# Patient Record
Sex: MALE | Race: BLACK OR AFRICAN AMERICAN | Employment: UNEMPLOYED | URBAN - METROPOLITAN AREA
[De-identification: names, ages, dates, MRNs, and addresses within clinical notes are randomized per-mention and may not be internally consistent; named-entity substitution may affect disease eponyms.]

---

## 2019-01-05 ENCOUNTER — HOSPITAL ENCOUNTER (EMERGENCY)
Age: 44
Discharge: HOME | End: 2019-01-05
Attending: EMERGENCY MEDICINE
Payer: SUBSIDIZED

## 2019-01-05 VITALS
DIASTOLIC BLOOD PRESSURE: 90 MMHG | TEMPERATURE: 97.8 F | OXYGEN SATURATION: 100 % | BODY MASS INDEX: 44.71 KG/M2 | RESPIRATION RATE: 18 BRPM | SYSTOLIC BLOOD PRESSURE: 154 MMHG | HEIGHT: 67 IN | WEIGHT: 284.83 LBS | HEART RATE: 78 BPM

## 2019-01-05 DIAGNOSIS — H65.93 FLUID LEVEL BEHIND TYMPANIC MEMBRANE OF BOTH EARS: ICD-10-CM

## 2019-01-05 DIAGNOSIS — Z20.2 EXPOSURE TO STD: Primary | ICD-10-CM

## 2019-01-05 LAB
APPEARANCE UR: CLEAR
BACTERIA URNS QL MICRO: NEGATIVE /HPF
BILIRUB UR QL: NEGATIVE
COLOR UR: NORMAL
EPITH CASTS URNS QL MICRO: NORMAL /LPF
GLUCOSE UR STRIP.AUTO-MCNC: NEGATIVE MG/DL
HGB UR QL STRIP: NEGATIVE
HYALINE CASTS URNS QL MICRO: NORMAL /LPF (ref 0–5)
KETONES UR QL STRIP.AUTO: NEGATIVE MG/DL
LEUKOCYTE ESTERASE UR QL STRIP.AUTO: NEGATIVE
NITRITE UR QL STRIP.AUTO: NEGATIVE
PH UR STRIP: 5.5 [PH] (ref 5–8)
PROT UR STRIP-MCNC: NEGATIVE MG/DL
RBC #/AREA URNS HPF: NORMAL /HPF (ref 0–5)
SP GR UR REFRACTOMETRY: 1.02 (ref 1–1.03)
UA: UC IF INDICATED,UAUC: NORMAL
UROBILINOGEN UR QL STRIP.AUTO: 0.2 EU/DL (ref 0.2–1)
WBC URNS QL MICRO: NORMAL /HPF (ref 0–4)

## 2019-01-05 PROCEDURE — 87491 CHLMYD TRACH DNA AMP PROBE: CPT

## 2019-01-05 PROCEDURE — 81001 URINALYSIS AUTO W/SCOPE: CPT

## 2019-01-05 PROCEDURE — 99282 EMERGENCY DEPT VISIT SF MDM: CPT

## 2019-01-05 RX ORDER — METRONIDAZOLE 500 MG/1
500 TABLET ORAL 2 TIMES DAILY
Qty: 20 TAB | Refills: 0 | Status: SHIPPED | OUTPATIENT
Start: 2019-01-05 | End: 2019-01-15

## 2019-01-05 NOTE — DISCHARGE INSTRUCTIONS
Patient Education        Exposure to Sexually Transmitted Infections: Care Instructions  Your Care Instructions  Sexually transmitted infections (STIs) are those diseases spread by sexual contact. There are at least 20 different STIs, including chlamydia, gonorrhea, syphilis, and human immunodeficiency virus (HIV), which causes AIDS. Bacteria-caused STIs can be treated and cured. STIs caused by viruses, such as HIV, can be treated but not cured. Some STIs can reduce a woman's chances of getting pregnant in the future. STIs are spread during sexual contact, such as vaginal intercourse and oral or anal sex. Follow-up care is a key part of your treatment and safety. Be sure to make and go to all appointments, and call your doctor if you are having problems. It's also a good idea to know your test results and keep a list of the medicines you take. How can you care for yourself at home? · Your doctor may have given you a shot of antibiotics. If your doctor prescribed antibiotic pills, take them as directed. Do not stop taking them just because you feel better. You need to take the full course of antibiotics. · Do not have sexual contact while you have symptoms of an STI or are being treated for an STI. · Tell your sex partner (or partners) that he or she will need treatment. · If you are a woman, do not douche. Douching changes the normal balance of bacteria in the vagina and may spread an infection up into your reproductive organs. To prevent exposure to STIs in the future  · Use latex condoms every time you have sex. Use them from the beginning to the end of sexual contact. · Talk to your partner before you have sex. Find out if he or she has or is at risk for any STI. Keep in mind that a person may be able to spread an STI even if he or she does not have symptoms. · Do not have sex if you are being treated for an STI.   · Do not have sex with anyone who has symptoms of an STI, such as sores on the genitals or mouth.  · Having one sex partner (who does not have STIs and does not have sex with anyone else) is a good way to avoid STIs. When should you call for help? Call your doctor now or seek immediate medical care if:    · You have new pain in your belly or pelvis.     · You have symptoms of a urinary tract infection. These may include:  ? Pain or burning when you urinate. ? A frequent need to urinate without being able to pass much urine. ? Pain in the flank, which is just below the rib cage and above the waist on either side of the back. ? Blood in your urine. ? A fever.     · You have new or worsening pain or swelling in the scrotum.    Watch closely for changes in your health, and be sure to contact your doctor if:    · You have unusual vaginal bleeding.     · You have a discharge from the vagina or penis.     · You have any new symptoms, such as sores, bumps, rashes, blisters, or warts.     · You have itching, tingling, pain, or burning in the genital or anal area.     · You think you may have an STI. Where can you learn more? Go to http://orly-zachery.info/. Enter R065 in the search box to learn more about \"Exposure to Sexually Transmitted Infections: Care Instructions. \"  Current as of: November 27, 2017  Content Version: 11.8  © 9874-5848 [employer]. Care instructions adapted under license by [employer] (which disclaims liability or warranty for this information). If you have questions about a medical condition or this instruction, always ask your healthcare professional. [place] [address] any warranty or liability for your use of this information.

## 2019-01-05 NOTE — ED NOTES
JAHAIRA Marley reviewed discharge instructions with the patient. The patient verbalized understanding. Patient ambulatory out of ED with steady gait. No further complaints noted.

## 2019-01-05 NOTE — ED PROVIDER NOTES
EMERGENCY DEPARTMENT HISTORY AND PHYSICAL EXAM    Date: 1/5/2019  Patient Name: Anderson Dial    History of Presenting Illness     Chief Complaint   Patient presents with    Ear Pain     right ear pain for a few days    Exposure to STD     denies any s/s but would like to be checked         History Provided By: Patient    HPI: Anderson Dial is a 41 y.o. male with a PMH of hypertension who presents with cc of right ear pain for 2 days and exposure to trich from his girlfriend. Pt states he has no symptoms and has been trying advil cold and sinus for his hear pain. He specifically denies any urinary symptoms or penile discharge, fevers, chills, nausea, vomiting, chest pain, shortness of breath, headache, rash, diarrhea, sweating or weight loss. All other ROS negative at this time  Pt is in no acute distress and is speaking in full sentences      PCP: Ricarda Lance, DO    Current Outpatient Medications   Medication Sig Dispense Refill    metroNIDAZOLE (FLAGYL) 500 mg tablet Take 1 Tab by mouth two (2) times a day for 10 days. 21 Tab 0       Past History     Past Medical History:  Past Medical History:   Diagnosis Date    Hypertension     Rotator cuff injury        Past Surgical History:  Past Surgical History:   Procedure Laterality Date    HX BACK SURGERY  1999    HX ORTHOPAEDIC      finger        Family History:  Family History   Problem Relation Age of Onset    Asthma Mother     Hypertension Mother     Hypertension Father     Hypertension Paternal Uncle     Diabetes Paternal Grandmother        Social History:  Social History     Tobacco Use    Smoking status: Current Every Day Smoker     Packs/day: 0.50     Years: 0.00     Pack years: 0.00   Substance Use Topics    Alcohol use: Yes     Comment: occasionally    Drug use: Yes     Types: Marijuana, Opiates       Allergies:   Allergies   Allergen Reactions    Morphine Nausea and Vomiting         Review of Systems   Review of Systems Constitutional: Negative. Negative for chills and fever. HENT: Positive for ear pain. Negative for congestion, ear discharge, sinus pressure, sinus pain, sneezing and sore throat. Eyes: Negative. Negative for pain and redness. Respiratory: Negative. Negative for shortness of breath. Cardiovascular: Negative. Negative for chest pain. Gastrointestinal: Negative. Negative for abdominal pain, diarrhea, nausea and vomiting. Endocrine: Negative. Genitourinary: Negative. Negative for discharge, dysuria, flank pain, penile pain, penile swelling, scrotal swelling, testicular pain and urgency. Musculoskeletal: Negative. Skin: Negative. Allergic/Immunologic: Negative. Neurological: Negative. Negative for headaches. Hematological: Negative. Psychiatric/Behavioral: Negative. All other systems reviewed and are negative. Physical Exam     Vitals:    01/05/19 1357   BP: 154/90   Pulse: 78   Resp: 18   Temp: 97.8 °F (36.6 °C)   SpO2: 100%   Weight: 129.2 kg (284 lb 13.4 oz)   Height: 5' 7\" (1.702 m)     Physical Exam   Constitutional: He is oriented to person, place, and time. He appears well-developed and well-nourished. No distress. HENT:   Head: Normocephalic and atraumatic. Right Ear: Hearing, external ear and ear canal normal. No mastoid tenderness. Tympanic membrane is bulging. Tympanic membrane is not perforated, not erythematous and not retracted. No hemotympanum. Left Ear: Hearing, external ear and ear canal normal. No mastoid tenderness. Tympanic membrane is bulging. Tympanic membrane is not perforated, not erythematous and not retracted. No hemotympanum. Nose: Nose normal.   Mouth/Throat: Oropharynx is clear and moist. No oropharyngeal exudate. Eyes: Conjunctivae and EOM are normal. Pupils are equal, round, and reactive to light. Neck: Normal range of motion. Neck supple.    Cardiovascular: Normal rate, regular rhythm, normal heart sounds and intact distal pulses. Pulmonary/Chest: Effort normal and breath sounds normal. No respiratory distress. He has no wheezes. He has no rales. Abdominal: Soft. Bowel sounds are normal. He exhibits no distension. There is no tenderness. There is no rebound, no guarding, no CVA tenderness, no tenderness at McBurney's point and negative Bravo's sign. Musculoskeletal: Normal range of motion. He exhibits no edema, tenderness or deformity. Lymphadenopathy:     He has no cervical adenopathy. Neurological: He is alert and oriented to person, place, and time. He has normal reflexes. He displays normal reflexes. No cranial nerve deficit. He exhibits normal muscle tone. Coordination normal.   Skin: No rash noted. He is not diaphoretic. Psychiatric: He has a normal mood and affect. His behavior is normal. Judgment and thought content normal.   Nursing note and vitals reviewed. Diagnostic Study Results     Labs -     Recent Results (from the past 12 hour(s))   URINALYSIS W/ REFLEX CULTURE    Collection Time: 01/05/19  2:03 PM   Result Value Ref Range    Color YELLOW/STRAW      Appearance CLEAR CLEAR      Specific gravity 1.020 1.003 - 1.030      pH (UA) 5.5 5.0 - 8.0      Protein NEGATIVE  NEG mg/dL    Glucose NEGATIVE  NEG mg/dL    Ketone NEGATIVE  NEG mg/dL    Bilirubin NEGATIVE  NEG      Blood NEGATIVE  NEG      Urobilinogen 0.2 0.2 - 1.0 EU/dL    Nitrites NEGATIVE  NEG      Leukocyte Esterase NEGATIVE  NEG      WBC 0-4 0 - 4 /hpf    RBC 0-5 0 - 5 /hpf    Epithelial cells FEW FEW /lpf    Bacteria NEGATIVE  NEG /hpf    UA:UC IF INDICATED CULTURE NOT INDICATED BY UA RESULT CNI      Hyaline cast 0-2 0 - 5 /lpf       Radiologic Studies -   No orders to display     CT Results  (Last 48 hours)    None        CXR Results  (Last 48 hours)    None            Medical Decision Making   I am the first provider for this patient.     I reviewed the vital signs, available nursing notes, past medical history, past surgical history, family history and social history. Vital Signs-Reviewed the patient's vital signs. Records Reviewed: Nursing Notes, Old Medical Records, Previous Radiology Studies and Previous Laboratory Studies            Disposition:  Discharge     DISCHARGE NOTE:   Care plan outlined and precautions discussed. Patient has no new complaints, changes, or physical findings. Results of visit were reviewed with the patient. All medications were reviewed with the patient; will d/c home. All of pt's questions and concerns were addressed. Patient was instructed and agrees to follow up with pcp, as well as to return to the ED upon further deterioration. Patient is ready to go home. Follow-up Information     Follow up With Specialties Details Why Bryan Zhu DO Internal Medicine Schedule an appointment as soon as possible for a visit in 3 days If symptoms worsen [address] [facility]  [phone]            Discharge Medication List as of 1/5/2019  2:47 PM      START taking these medications    Details   metroNIDAZOLE (FLAGYL) 500 mg tablet Take 1 Tab by mouth two (2) times a day for 10 days. , Normal, Disp-20 Tab, R-0             Provider Notes (Medical Decision Making):   DDX: om, oe, fluid effusion, trich, gc     Worsening si/sxs discussed extensively   Follow up with PCP or RTC if symptoms/signs worsen  Side effects of medication discussed  Education materials provided at discharge   Pt verbalizes agreement with plan    Procedures:  Procedures        Diagnosis     Clinical Impression:   1. Exposure to STD    2.  Fluid level behind tympanic membrane of both ears

## 2019-01-07 LAB
C TRACH DNA SPEC QL NAA+PROBE: NEGATIVE
N GONORRHOEA DNA SPEC QL NAA+PROBE: NEGATIVE
SAMPLE TYPE: NORMAL
SERVICE CMNT-IMP: NORMAL
SPECIMEN SOURCE: NORMAL

## 2019-04-04 ENCOUNTER — OFFICE VISIT (OUTPATIENT)
Dept: FAMILY MEDICINE CLINIC | Age: 44
End: 2019-04-04

## 2019-04-04 VITALS
BODY MASS INDEX: 45.04 KG/M2 | HEIGHT: 67 IN | TEMPERATURE: 97.5 F | RESPIRATION RATE: 20 BRPM | DIASTOLIC BLOOD PRESSURE: 68 MMHG | OXYGEN SATURATION: 100 % | HEART RATE: 71 BPM | SYSTOLIC BLOOD PRESSURE: 146 MMHG | WEIGHT: 287 LBS

## 2019-04-04 DIAGNOSIS — I10 HYPERTENSION GOAL BP (BLOOD PRESSURE) < 130/80: ICD-10-CM

## 2019-04-04 DIAGNOSIS — R35.0 FREQUENCY OF URINATION: ICD-10-CM

## 2019-04-04 DIAGNOSIS — E55.9 VITAMIN D DEFICIENCY: ICD-10-CM

## 2019-04-04 DIAGNOSIS — M79.661 BILATERAL CALF PAIN: ICD-10-CM

## 2019-04-04 DIAGNOSIS — Z00.00 ENCOUNTER FOR ANNUAL PHYSICAL EXAM: ICD-10-CM

## 2019-04-04 DIAGNOSIS — R73.02 IGT (IMPAIRED GLUCOSE TOLERANCE): ICD-10-CM

## 2019-04-04 DIAGNOSIS — Z13.220 SCREENING CHOLESTEROL LEVEL: ICD-10-CM

## 2019-04-04 DIAGNOSIS — M25.561 PAIN AND SWELLING OF RIGHT KNEE: ICD-10-CM

## 2019-04-04 DIAGNOSIS — Z13.29 SCREENING FOR THYROID DISORDER: ICD-10-CM

## 2019-04-04 DIAGNOSIS — M79.662 BILATERAL CALF PAIN: ICD-10-CM

## 2019-04-04 DIAGNOSIS — M25.461 PAIN AND SWELLING OF RIGHT KNEE: ICD-10-CM

## 2019-04-04 DIAGNOSIS — F17.200 CURRENT EVERY DAY SMOKER: ICD-10-CM

## 2019-04-04 DIAGNOSIS — Z23 ENCOUNTER FOR IMMUNIZATION: Primary | ICD-10-CM

## 2019-04-04 PROBLEM — E66.01 OBESITY, MORBID (HCC): Status: ACTIVE | Noted: 2019-04-04

## 2019-04-04 RX ORDER — AMLODIPINE BESYLATE 5 MG/1
5 TABLET ORAL DAILY
Qty: 30 TAB | Refills: 2 | Status: SHIPPED | OUTPATIENT
Start: 2019-04-04 | End: 2019-08-08

## 2019-04-04 NOTE — PROGRESS NOTES
Chief Complaint   Patient presents with    New Patient    Circulatory problem     right leg    Knee Injury     Right Knee     HPI:  Alexandria Sharma is a 41 y.o. AA male with history of hypertension presents to establish care  He has complaints of right leg pain and remote history of right knee Injury during foot ball game in high school. Pain is intermittent, without swelling, located in medial and lateral areas. Also, discussed the patient's BMI with him.     BMI follow up plan is as follows: dietary management education, guidance and counseling, encourage exercise and monitor weight    Review of Systems  Constitutional: negative for fevers,/chills  Eyes:   negative for visual disturbance   Respiratory:  negative for cough, dyspnea,wheezing  CV:   negative for chest pain, palpitations, lower extremity edema  GI:   negative for abdominal pain  Endo:               negative for polyuria,polydipsia,polyphagia,heat intolerance  Genitourinary: negative for frequency, dysuria   Integument:  negative for rash and pruritus  Musculoskel: positive for right knee and foot pain  Neurological:  negative for headaches, dizziness    Behavl/Psych: negative for feelings of anxiety, depression, mood changes    Past Medical History:   Diagnosis Date    Hypertension     Rotator cuff injury      Past Surgical History:   Procedure Laterality Date    HX BACK SURGERY  1999    HX ORTHOPAEDIC      finger     HX SHOULDER ARTHROSCOPY       Social History     Socioeconomic History    Marital status: SINGLE     Spouse name: Not on file    Number of children: Not on file    Years of education: Not on file    Highest education level: Not on file   Tobacco Use    Smoking status: Current Every Day Smoker     Packs/day: 0.50     Years: 0.00     Pack years: 0.00    Smokeless tobacco: Never Used   Substance and Sexual Activity    Alcohol use: Yes     Comment: occasionally    Drug use: Yes     Types: Marijuana    Sexual activity: Yes     Partners: Female     Birth control/protection: None     Family History   Problem Relation Age of Onset    Asthma Mother     Hypertension Mother     Hypertension Father     Hypertension Paternal Uncle     Diabetes Paternal Grandmother        Allergies   Allergen Reactions    Morphine Nausea and Vomiting       Objective:  Visit Vitals  /68   Pulse 71   Temp 97.5 °F (36.4 °C) (Oral)   Resp 20   Ht 5' 7\" (1.702 m)   Wt 287 lb (130.2 kg)   SpO2 100%   BMI 44.95 kg/m²     Physical Exam:   General appearance - alert, well appearing in no distress  Mental status - alert, oriented to person, place, and time  EYE-PERRL, EOMI  ENT-ENT exam normal, no neck nodes or sinus tenderness  Mouth - mucous membranes moist, pharynx normal without lesions  Neck - supple, no significant adenopathy   Chest - clear to auscultation, no wheezes, rales or rhonchi  Heart - normal rate, regular rhythm, normal blood pressure  Abdomen - soft, nontender, nondistended, no organomegaly  Ext-peripheral pulses normal, no pedal edema  Neuro -alert, oriented, normal speech, no focal findings   Back-full range of motion, no tenderness, palpable spasm or pain on motion   Knee-antalgic gait, reduced range of motion, mild tenderness     Results for orders placed or performed during the hospital encounter of 01/05/19   CHLAMYDIA/GC PCR   Result Value Ref Range    Sample type URINE      Source URINE      Chlamydia amplified NEGATIVE  NEG      N. gonorrhea, amplified NEGATIVE  NEG      Comment        Testing performed by the Roche [place] CT/NG method, utilizing PCR amplification to identify DNA of the pathogens. This method is not recommended as the sole method of evaluation of cases of sexual abuse nor for other medico-legal indications.    URINALYSIS W/ REFLEX CULTURE   Result Value Ref Range    Color YELLOW/STRAW      Appearance CLEAR CLEAR      Specific gravity 1.020 1.003 - 1.030      pH (UA) 5.5 5.0 - 8.0      Protein NEGATIVE  NEG mg/dL Glucose NEGATIVE  NEG mg/dL    Ketone NEGATIVE  NEG mg/dL    Bilirubin NEGATIVE  NEG      Blood NEGATIVE  NEG      Urobilinogen 0.2 0.2 - 1.0 EU/dL    Nitrites NEGATIVE  NEG      Leukocyte Esterase NEGATIVE  NEG      WBC 0-4 0 - 4 /hpf    RBC 0-5 0 - 5 /hpf    Epithelial cells FEW FEW /lpf    Bacteria NEGATIVE  NEG /hpf    UA:UC IF INDICATED CULTURE NOT INDICATED BY UA RESULT CNI      Hyaline cast 0-2 0 - 5 /lpf     Assessment/Plan:  Diagnoses and all orders for this visit:    1. Encounter for immunization  -     TETANUS, DIPHTHERIA TOXOIDS AND ACELLULAR PERTUSSIS VACCINE (TDAP), IN INDIVIDS. >=7, IM  -     NV IMMUNIZ ADMIN,1 SINGLE/COMB VAC/TOXOID    2. Pain and swelling of right knee    3. Bilateral calf pain  -     ANKLE BRACHIAL INDEX; Future    4. Hypertension goal BP (blood pressure) < 616/57  -     METABOLIC PANEL, COMPREHENSIVE  -     amLODIPine (NORVASC) 5 mg tablet; Take 1 Tab by mouth daily. 5. Current every day smoker    6. Encounter for annual physical exam  -     METABOLIC PANEL, COMPREHENSIVE  -     CBC WITH AUTOMATED DIFF    7. Screening for thyroid disorder  -     TSH 3RD GENERATION    8. Screening cholesterol level  -     LIPID PANEL    9. IGT (impaired glucose tolerance)  -     HEMOGLOBIN A1C WITH EAG    10. Vitamin D deficiency  -     VITAMIN D, 25 HYDROXY    11. Frequency of urination  -     URINALYSIS W/ RFLX MICROSCOPIC      Patient Instructions     Vaccine Information Statement     Tdap (Tetanus, Diphtheria, Pertussis) Vaccine: What You Need to Know    Many Vaccine Information Statements are available in [place]erm[place]ian and other languages. See www.immunize.org/vis. Hojas de Información Sobre Vacunas están disponibles en español y en muchos otros idiomas. Visite [email or web address]    1. Why get vaccinated? Tetanus, diphtheria, and pertussis are very serious diseases. Tdap vaccine can protect us from these diseases.   And, Tdap vaccine given to pregnant women can protect  babies against pertussis. TETANUS (Lockjaw) is rare in the [facility] today. It causes painful muscle tightening and stiffness, usually all over the body.  It can lead to tightening of muscles in the head and neck so you cant open your mouth, swallow, or sometimes even breathe. Tetanus kills about 1 out of 10 people who are infected even after receiving the best medical care. DIPHTHERIA is also rare in the [facility] today. It can cause a thick coating to form in the back of the throat.  It can lead to breathing problems, heart failure, paralysis, and death. PERTUSSIS (Whooping Cough) causes severe coughing spells, which can cause difficulty breathing, vomiting, and disturbed sleep.  It can also lead to weight loss, incontinence, and rib fractures. Up to 2 in 100 adolescents and 5 in 100 adults with pertussis are hospitalized or have complications, which could include pneumonia or death. These diseases are caused by bacteria. Diphtheria and pertussis are spread from person to person through secretions from coughing or sneezing. Tetanus enters the body through cuts, scratches, or wounds. Before vaccines, as many as 200,000 cases of diphtheria, 200,000 cases of pertussis, and hundreds of cases of tetanus, were reported in the [place] each year. Since vaccination began, reports of cases for tetanus and diphtheria have dropped by about 99% and for pertussis by about 80%. 2. Tdap vaccine    Tdap vaccine can protect adolescents and adults from tetanus, diphtheria, and pertussis. One dose of Tdap is routinely given at age 6 or 15. People who did not get Tdap at that age should get it as soon as possible. Tdap is especially important for health care professionals and anyone having close contact with a baby younger than 12 months. Pregnant women should get a dose of Tdap during every pregnancy, to protect the  from pertussis.   Infants are most at risk for severe, life-threatening complications from pertussis. Another vaccine, called Td, protects against tetanus and diphtheria, but not pertussis. A Td booster should be given every 10 years. Tdap may be given as one of these boosters if you have never gotten Tdap before. Tdap may also be given after a severe cut or burn to prevent tetanus infection. Your doctor or the person giving you the vaccine can give you more information. Tdap may safely be given at the same time as other vaccines. 3. Some people should not get this vaccine     A person who has ever had a life-threatening allergic reaction after a previous dose of any diphtheria, tetanus or pertussis containing vaccine, OR has a severe allergy to any part of this vaccine, should not get Tdap vaccine. Tell the person giving the vaccine about any severe allergies.  Anyone who had coma or long repeated seizures within 7 days after a childhood dose of DTP or DTaP, or a previous dose of Tdap, should not get Tdap, unless a cause other than the vaccine was found. They can still get Td.  Talk to your doctor if you:  - have seizures or another nervous system problem,  - had severe pain or swelling after any vaccine containing diphtheria, tetanus or pertussis,   - ever had a condition called Guillain Barré Syndrome (GBS),  - arent feeling well on the day the shot is scheduled. 4. Risks    With any medicine, including vaccines, there is a chance of side effects. These are usually mild and go away on their own. Serious reactions are also possible but are rare. Most people who get Tdap vaccine do not have any problems with it.     Mild Problems following Tdap  (Did not interfere with activities)   Pain where the shot was given (about 3 in 4 adolescents or 2 in 3 adults)   Redness or swelling where the shot was given (about 1 person in 5)   Mild fever of at least 100.4°F (up to about 1 in 25 adolescents or 1 in 100 adults)   Headache (about 3 or 4 people in 10)   Tiredness (about 1 person in 3 or 4)   Nausea, vomiting, diarrhea, stomach ache (up to 1 in 4 adolescents or 1 in 10 adults)   Chills,  sore joints (about 1 person in 10)   Body aches (about 1 person in 3 or 4)    Rash, swollen glands (uncommon)    Moderate Problems following Tdap  (Interfered with activities, but did not require medical attention)   Pain where the shot was given (up to 1 in 5 or 6)    Redness or swelling where the shot was given (up to about 1 in 16 adolescents or 1 in 12 adults)   Fever over 102°F (about 1 in 100 adolescents or 1 in 250 adults)   Headache (about 1 in 7 adolescents or 1 in 10 adults)   Nausea, vomiting, diarrhea, stomach ache (up to 1 or 3 people in 100)   Swelling of the entire arm where the shot was given (up to about 1 in 500). Severe Problems following Tdap  (Unable to perform usual activities; required medical attention)   Swelling, severe pain, bleeding, and redness in the arm where the shot was given (rare). Problems that could happen after any vaccine:     People sometimes faint after a medical procedure, including vaccination. Sitting or lying down for about 15 minutes can help prevent fainting, and injuries caused by a fall. Tell your doctor if you feel dizzy, or have vision changes or ringing in the ears.  Some people get severe pain in the shoulder and have difficulty moving the arm where a shot was given. This happens very rarely.  Any medication can cause a severe allergic reaction. Such reactions from a vaccine are very rare, estimated at fewer than 1 in a million doses, and would happen within a few minutes to a few hours after the vaccination. As with any medicine, there is a very remote chance of a vaccine causing a serious injury or death. The safety of vaccines is always being monitored. For more information, visit: www.cdc.gov/vaccinesafety/    5. What if there is a serious problem?     What should I look for?   Look for anything that concerns you, such as signs of a severe allergic reaction, very high fever, or unusual behavior.  Signs of a severe allergic reaction can include hives, swelling of the face and throat, difficulty breathing, a fast heartbeat, dizziness, and weakness. These would usually start a few minutes to a few hours after the vaccination. What should I do?  If you think it is a severe allergic reaction or other emergency that cant wait, call 9-1-1 or get the person to the nearest hospital. Otherwise, call your doctor.  Afterward, the reaction should be reported to the Vaccine Adverse Event Reporting System (VAERS). Your doctor might file this report, or you can do it yourself through the VAERS web site at www.vaers. [facility].gov, or by calling 2-995.483.6303. VAERS does not give medical advice. 6. The National Vaccine Injury Compensation Program    The [facility] Vaccine Injury Compensation Program (VICP) is a federal program that was created to compensate people who may have been injured by certain vaccines. Persons who believe they may have been injured by a vaccine can learn about the program and about filing a claim by calling 2-313.406.2177 or visiting the [address] [place] [address] website at www.[facility].gov/vaccinecompensation. There is a time limit to file a claim for compensation. 7. How can I learn more?  Ask your doctor. He or she can give you the vaccine package insert or suggest other sources of information.  Call your local or state health department.  Contact the Centers for Disease Control and Prevention (CDC):  - Call 8-514.986.2313 (1-800-CDC-INFO) or  - Visit CDCs website at www.cdc.gov/vaccines      Vaccine Information Statement   Tdap Vaccine  (2/24/2015)  42 U. Karen Oppenheim [id number]    Department of Health and Human Services  Centers for Disease Control and Prevention    Office Use Only         Follow-up and Dispositions    · Return 1-2 weeks, for f/u results.

## 2019-04-04 NOTE — PATIENT INSTRUCTIONS
Vaccine Information Statement     Tdap (Tetanus, Diphtheria, Pertussis) Vaccine: What You Need to Know    Many Vaccine Information Statements are available in Armenian and other languages. See www.immunize.org/vis. Hojas de Información Sobre Vacunas están disponibles en español y en muchos otros idiomas. Visite [email or web address]    1. Why get vaccinated? Tetanus, diphtheria, and pertussis are very serious diseases. Tdap vaccine can protect us from these diseases. And, Tdap vaccine given to pregnant women can protect  babies against pertussis. TETANUS (Lockjaw) is rare in the [facility] today. It causes painful muscle tightening and stiffness, usually all over the body.  It can lead to tightening of muscles in the head and neck so you cant open your mouth, swallow, or sometimes even breathe. Tetanus kills about 1 out of 10 people who are infected even after receiving the best medical care. DIPHTHERIA is also rare in the [facility] today. It can cause a thick coating to form in the back of the throat.  It can lead to breathing problems, heart failure, paralysis, and death. PERTUSSIS (Whooping Cough) causes severe coughing spells, which can cause difficulty breathing, vomiting, and disturbed sleep.  It can also lead to weight loss, incontinence, and rib fractures. Up to 2 in 100 adolescents and 5 in 100 adults with pertussis are hospitalized or have complications, which could include pneumonia or death. These diseases are caused by bacteria. Diphtheria and pertussis are spread from person to person through secretions from coughing or sneezing. Tetanus enters the body through cuts, scratches, or wounds. Before vaccines, as many as 200,000 cases of diphtheria, 200,000 cases of pertussis, and hundreds of cases of tetanus, were reported in the [place] each year.  Since vaccination began, reports of cases for tetanus and diphtheria have dropped by about 99% and for pertussis by about 80%. 2. Tdap vaccine    Tdap vaccine can protect adolescents and adults from tetanus, diphtheria, and pertussis. One dose of Tdap is routinely given at age 6 or 15. People who did not get Tdap at that age should get it as soon as possible. Tdap is especially important for health care professionals and anyone having close contact with a baby younger than 12 months. Pregnant women should get a dose of Tdap during every pregnancy, to protect the  from pertussis. Infants are most at risk for severe, life-threatening complications from pertussis. Another vaccine, called Td, protects against tetanus and diphtheria, but not pertussis. A Td booster should be given every 10 years. Tdap may be given as one of these boosters if you have never gotten Tdap before. Tdap may also be given after a severe cut or burn to prevent tetanus infection. Your doctor or the person giving you the vaccine can give you more information. Tdap may safely be given at the same time as other vaccines. 3. Some people should not get this vaccine     A person who has ever had a life-threatening allergic reaction after a previous dose of any diphtheria, tetanus or pertussis containing vaccine, OR has a severe allergy to any part of this vaccine, should not get Tdap vaccine. Tell the person giving the vaccine about any severe allergies.  Anyone who had coma or long repeated seizures within 7 days after a childhood dose of DTP or DTaP, or a previous dose of Tdap, should not get Tdap, unless a cause other than the vaccine was found. They can still get Td.  Talk to your doctor if you:  - have seizures or another nervous system problem,  - had severe pain or swelling after any vaccine containing diphtheria, tetanus or pertussis,   - ever had a condition called Guillain Barré Syndrome (GBS),  - arent feeling well on the day the shot is scheduled.     4. Risks    With any medicine, including vaccines, there is a chance of side effects. These are usually mild and go away on their own. Serious reactions are also possible but are rare. Most people who get Tdap vaccine do not have any problems with it. Mild Problems following Tdap  (Did not interfere with activities)   Pain where the shot was given (about 3 in 4 adolescents or 2 in 3 adults)   Redness or swelling where the shot was given (about 1 person in 5)   Mild fever of at least 100.4°F (up to about 1 in 25 adolescents or 1 in 100 adults)   Headache (about 3 or 4 people in 10)   Tiredness (about 1 person in 3 or 4)   Nausea, vomiting, diarrhea, stomach ache (up to 1 in 4 adolescents or 1 in 10 adults)   Chills,  sore joints (about 1 person in 10)   Body aches (about 1 person in 3 or 4)    Rash, swollen glands (uncommon)    Moderate Problems following Tdap  (Interfered with activities, but did not require medical attention)   Pain where the shot was given (up to 1 in 5 or 6)    Redness or swelling where the shot was given (up to about 1 in 16 adolescents or 1 in 12 adults)   Fever over 102°F (about 1 in 100 adolescents or 1 in 250 adults)   Headache (about 1 in 7 adolescents or 1 in 10 adults)   Nausea, vomiting, diarrhea, stomach ache (up to 1 or 3 people in 100)   Swelling of the entire arm where the shot was given (up to about 1 in 500). Severe Problems following Tdap  (Unable to perform usual activities; required medical attention)   Swelling, severe pain, bleeding, and redness in the arm where the shot was given (rare). Problems that could happen after any vaccine:     People sometimes faint after a medical procedure, including vaccination. Sitting or lying down for about 15 minutes can help prevent fainting, and injuries caused by a fall. Tell your doctor if you feel dizzy, or have vision changes or ringing in the ears.      Some people get severe pain in the shoulder and have difficulty moving the arm where a shot was given. This happens very rarely.  Any medication can cause a severe allergic reaction. Such reactions from a vaccine are very rare, estimated at fewer than 1 in a million doses, and would happen within a few minutes to a few hours after the vaccination. As with any medicine, there is a very remote chance of a vaccine causing a serious injury or death. The safety of vaccines is always being monitored. For more information, visit: www.cdc.gov/vaccinesafety/    5. What if there is a serious problem? What should I look for?  Look for anything that concerns you, such as signs of a severe allergic reaction, very high fever, or unusual behavior.  Signs of a severe allergic reaction can include hives, swelling of the face and throat, difficulty breathing, a fast heartbeat, dizziness, and weakness. These would usually start a few minutes to a few hours after the vaccination. What should I do?  If you think it is a severe allergic reaction or other emergency that cant wait, call 9-1-1 or get the person to the nearest hospital. Otherwise, call your doctor.  Afterward, the reaction should be reported to the Vaccine Adverse Event Reporting System (VAERS). Your doctor might file this report, or you can do it yourself through the VAERS web site at www.vaers. [facility].gov, or by calling 6-979.861.1197. VAERS does not give medical advice. 6. The National Vaccine Injury Compensation Program    The [facility] Vaccine Injury Compensation Program (VICP) is a federal program that was created to compensate people who may have been injured by certain vaccines. Persons who believe they may have been injured by a vaccine can learn about the program and about filing a claim by calling 7-429.755.3232 or visiting the [employer] website at www.[facility].gov/vaccinecompensation. There is a time limit to file a claim for compensation. 7. How can I learn more?  Ask your doctor.  He or she can give you the vaccine package insert or suggest other sources of information.  Call your local or state health department.  Contact the Centers for Disease Control and Prevention (CDC):  - Call 7-810.178.8989 (1-800-CDC-INFO) or  - Visit CDCs website at www.cdc.gov/vaccines      Vaccine Information Statement   Tdap Vaccine  (2/24/2015)  42 BRUNO Snell [id number]    Department of Health and Human Services  Centers for Disease Control and Prevention    Office Use Only

## 2019-04-05 ENCOUNTER — HOSPITAL ENCOUNTER (OUTPATIENT)
Dept: GENERAL RADIOLOGY | Age: 44
Discharge: HOME | End: 2019-04-05
Payer: SUBSIDIZED

## 2019-04-05 ENCOUNTER — TELEPHONE (OUTPATIENT)
Dept: FAMILY MEDICINE CLINIC | Age: 44
End: 2019-04-05

## 2019-04-05 DIAGNOSIS — M79.662 BILATERAL CALF PAIN: ICD-10-CM

## 2019-04-05 DIAGNOSIS — M25.461 PAIN AND SWELLING OF RIGHT KNEE: ICD-10-CM

## 2019-04-05 DIAGNOSIS — M79.661 BILATERAL CALF PAIN: ICD-10-CM

## 2019-04-05 DIAGNOSIS — M25.561 PAIN AND SWELLING OF RIGHT KNEE: ICD-10-CM

## 2019-04-05 LAB
25(OH)D3+25(OH)D2 SERPL-MCNC: 26.2 NG/ML (ref 30–100)
ALBUMIN SERPL-MCNC: 4.5 G/DL (ref 3.5–5.5)
ALBUMIN/GLOB SERPL: 1.7 {RATIO} (ref 1.2–2.2)
ALP SERPL-CCNC: 44 IU/L (ref 39–117)
ALT SERPL-CCNC: 20 IU/L (ref 0–44)
APPEARANCE UR: CLEAR
AST SERPL-CCNC: 18 IU/L (ref 0–40)
BASOPHILS # BLD AUTO: 0 X10E3/UL (ref 0–0.2)
BASOPHILS NFR BLD AUTO: 1 %
BILIRUB SERPL-MCNC: 0.5 MG/DL (ref 0–1.2)
BILIRUB UR QL STRIP: NEGATIVE
BUN SERPL-MCNC: 15 MG/DL (ref 6–24)
BUN/CREAT SERPL: 11 (ref 9–20)
CALCIUM SERPL-MCNC: 9.5 MG/DL (ref 8.7–10.2)
CHLORIDE SERPL-SCNC: 102 MMOL/L (ref 96–106)
CHOLEST SERPL-MCNC: 209 MG/DL (ref 100–199)
CO2 SERPL-SCNC: 25 MMOL/L (ref 20–29)
COLOR UR: YELLOW
CREAT SERPL-MCNC: 1.42 MG/DL (ref 0.76–1.27)
EOSINOPHIL # BLD AUTO: 0.2 X10E3/UL (ref 0–0.4)
EOSINOPHIL NFR BLD AUTO: 4 %
ERYTHROCYTE [DISTWIDTH] IN BLOOD BY AUTOMATED COUNT: 14.4 % (ref 12.3–15.4)
EST. AVERAGE GLUCOSE BLD GHB EST-MCNC: 97 MG/DL
GLOBULIN SER CALC-MCNC: 2.6 G/DL (ref 1.5–4.5)
GLUCOSE SERPL-MCNC: 92 MG/DL (ref 65–99)
GLUCOSE UR QL: NEGATIVE
HBA1C MFR BLD: 5 % (ref 4.8–5.6)
HCT VFR BLD AUTO: 43 % (ref 37.5–51)
HDLC SERPL-MCNC: 53 MG/DL
HGB BLD-MCNC: 15.1 G/DL (ref 13–17.7)
HGB UR QL STRIP: NEGATIVE
IMM GRANULOCYTES # BLD AUTO: 0 X10E3/UL (ref 0–0.1)
IMM GRANULOCYTES NFR BLD AUTO: 0 %
KETONES UR QL STRIP: NEGATIVE
LDLC SERPL CALC-MCNC: 137 MG/DL (ref 0–99)
LEUKOCYTE ESTERASE UR QL STRIP: NEGATIVE
LYMPHOCYTES # BLD AUTO: 2 X10E3/UL (ref 0.7–3.1)
LYMPHOCYTES NFR BLD AUTO: 37 %
MCH RBC QN AUTO: 30.6 PG (ref 26.6–33)
MCHC RBC AUTO-ENTMCNC: 35.1 G/DL (ref 31.5–35.7)
MCV RBC AUTO: 87 FL (ref 79–97)
MICRO URNS: NORMAL
MONOCYTES # BLD AUTO: 0.4 X10E3/UL (ref 0.1–0.9)
MONOCYTES NFR BLD AUTO: 8 %
NEUTROPHILS # BLD AUTO: 2.7 X10E3/UL (ref 1.4–7)
NEUTROPHILS NFR BLD AUTO: 50 %
NITRITE UR QL STRIP: NEGATIVE
PH UR STRIP: 5.5 [PH] (ref 5–7.5)
PLATELET # BLD AUTO: 163 X10E3/UL (ref 150–379)
POTASSIUM SERPL-SCNC: 4.4 MMOL/L (ref 3.5–5.2)
PROT SERPL-MCNC: 7.1 G/DL (ref 6–8.5)
PROT UR QL STRIP: NEGATIVE
RBC # BLD AUTO: 4.94 X10E6/UL (ref 4.14–5.8)
SODIUM SERPL-SCNC: 138 MMOL/L (ref 134–144)
SP GR UR: 1.02 (ref 1–1.03)
TRIGL SERPL-MCNC: 97 MG/DL (ref 0–149)
TSH SERPL DL<=0.005 MIU/L-ACNC: 0.49 UIU/ML (ref 0.45–4.5)
UROBILINOGEN UR STRIP-MCNC: 0.2 MG/DL (ref 0.2–1)
VLDLC SERPL CALC-MCNC: 19 MG/DL (ref 5–40)
WBC # BLD AUTO: 5.3 X10E3/UL (ref 3.4–10.8)

## 2019-04-05 PROCEDURE — 73562 X-RAY EXAM OF KNEE 3: CPT

## 2019-04-05 NOTE — TELEPHONE ENCOUNTER
Called patient LM stating an appt with Vascular Study on April 10,2019 @1pm. appt mailed to patient.

## 2019-04-08 DIAGNOSIS — M25.461 KNEE EFFUSION, RIGHT: Primary | ICD-10-CM

## 2019-04-09 NOTE — TELEPHONE ENCOUNTER
----- Message from Issa Londono MD sent at 4/8/2019  9:46 PM EDT -----  A referral is placed to ortho for DJD and effusion  ----- Message -----  From: Akil Yanes Results In  Sent: 4/5/2019  12:49 PM  To: Issa Londono MD

## 2019-04-11 ENCOUNTER — TELEPHONE (OUTPATIENT)
Dept: FAMILY MEDICINE CLINIC | Age: 44
End: 2019-04-11

## 2019-04-11 NOTE — TELEPHONE ENCOUNTER
----- Message from [facility] sent at 4/11/2019 10:33 AM EDT -----  Regarding: Dr. Jorge A Tang   Pt [phone] needs a referral to an ortho doc that accepts the care card.

## 2019-05-08 ENCOUNTER — TELEPHONE (OUTPATIENT)
Dept: FAMILY MEDICINE CLINIC | Age: 44
End: 2019-05-08

## 2019-05-08 NOTE — TELEPHONE ENCOUNTER
----- Message from Dianelys Brunner sent at 5/8/2019  1:46 PM EDT -----  Regarding: Dr. Gold Rea: [phone]  Pt requesting a call back to set pt up with a new orthopedic specialist that accepts his insurance as the one he was referred to by the practice does not.

## 2019-05-24 ENCOUNTER — OFFICE VISIT (OUTPATIENT)
Dept: FAMILY MEDICINE CLINIC | Age: 44
End: 2019-05-24

## 2019-05-24 VITALS
HEART RATE: 78 BPM | BODY MASS INDEX: 44.42 KG/M2 | SYSTOLIC BLOOD PRESSURE: 117 MMHG | HEIGHT: 67 IN | RESPIRATION RATE: 20 BRPM | WEIGHT: 283 LBS | OXYGEN SATURATION: 98 % | DIASTOLIC BLOOD PRESSURE: 63 MMHG | TEMPERATURE: 98.6 F

## 2019-05-24 DIAGNOSIS — R51.9 HEADACHE DISORDER: ICD-10-CM

## 2019-05-24 DIAGNOSIS — I10 HYPERTENSION, WELL CONTROLLED: ICD-10-CM

## 2019-05-24 DIAGNOSIS — E55.9 VITAMIN D DEFICIENCY: Primary | ICD-10-CM

## 2019-05-24 DIAGNOSIS — R20.0 NUMBNESS AND TINGLING IN BOTH HANDS: ICD-10-CM

## 2019-05-24 DIAGNOSIS — E78.00 HYPERCHOLESTEROLEMIA: ICD-10-CM

## 2019-05-24 DIAGNOSIS — R20.2 NUMBNESS AND TINGLING IN BOTH HANDS: ICD-10-CM

## 2019-05-24 DIAGNOSIS — R20.2 NUMBNESS AND TINGLING OF LEFT LOWER EXTREMITY: ICD-10-CM

## 2019-05-24 DIAGNOSIS — R20.0 NUMBNESS AND TINGLING OF LEFT LOWER EXTREMITY: ICD-10-CM

## 2019-05-24 RX ORDER — CHOLECALCIFEROL TAB 125 MCG (5000 UNIT) 125 MCG
5000 TAB ORAL DAILY
Qty: 90 TAB | Refills: 1 | Status: SHIPPED | OUTPATIENT
Start: 2019-05-24 | End: 2019-08-12

## 2019-05-24 NOTE — LETTER
5/24/2019 12:13 PM 
 
Mr. Kaelyn Nicole [address] [id number] Dear Kaelyn Nicole: 
 
Please find your most recent results below. Tot chol and LDL are beginning to go up, vit D is low, Resulted Orders METABOLIC PANEL, COMPREHENSIVE (Collected: 4/4/2019 11:11 AM) Result Value Ref Range Glucose 92 65 - 99 mg/dL BUN 15 6 - 24 mg/dL Creatinine 1.42 (H) 0.76 - 1.27 mg/dL GFR est non-AA 61 >59 mL/min/1.73 GFR est AA 70 >59 mL/min/1.73  
 BUN/Creatinine ratio 11 9 - 20 Sodium 138 134 - 144 mmol/L Potassium 4.4 3.5 - 5.2 mmol/L Chloride 102 96 - 106 mmol/L  
 CO2 25 20 - 29 mmol/L Calcium 9.5 8.7 - 10.2 mg/dL Protein, total 7.1 6.0 - 8.5 g/dL Albumin 4.5 3.5 - 5.5 g/dL GLOBULIN, TOTAL 2.6 1.5 - 4.5 g/dL A-G Ratio 1.7 1.2 - 2.2 Bilirubin, total 0.5 0.0 - 1.2 mg/dL Alk. phosphatase 44 39 - 117 IU/L  
 AST (SGOT) 18 0 - 40 IU/L  
 ALT (SGPT) 20 0 - 44 IU/L Narrative Performed at:  [address]  [id number] : Jarvis Joe MD, Phone:  [phone] CBC WITH AUTOMATED DIFF (Collected: 4/4/2019 11:11 AM) Result Value Ref Range WBC 5.3 3.4 - 10.8 x10E3/uL  
 RBC 4.94 4.14 - 5.80 x10E6/uL HGB 15.1 13.0 - 17.7 g/dL HCT 43.0 37.5 - 51.0 % MCV 87 79 - 97 fL  
 MCH 30.6 26.6 - 33.0 pg  
 MCHC 35.1 31.5 - 35.7 g/dL  
 RDW 14.4 12.3 - 15.4 % PLATELET 714 515 - 717 x10E3/uL NEUTROPHILS 50 Not Estab. % Lymphocytes 37 Not Estab. % MONOCYTES 8 Not Estab. % EOSINOPHILS 4 Not Estab. % BASOPHILS 1 Not Estab. %  
 ABS. NEUTROPHILS 2.7 1.4 - 7.0 x10E3/uL Abs Lymphocytes 2.0 0.7 - 3.1 x10E3/uL  
 ABS. MONOCYTES 0.4 0.1 - 0.9 x10E3/uL  
 ABS. EOSINOPHILS 0.2 0.0 - 0.4 x10E3/uL  
 ABS. BASOPHILS 0.0 0.0 - 0.2 x10E3/uL IMMATURE GRANULOCYTES 0 Not Estab. %  
 ABS. IMM. GRANS. 0.0 0.0 - 0.1 x10E3/uL Narrative Performed at:  [place] [address] [address]  [id number] : Pricilla Fulton MD, Phone:  [phone] LIPID PANEL (Collected: 4/4/2019 11:11 AM) Result Value Ref Range Cholesterol, total 209 (H) 100 - 199 mg/dL Triglyceride 97 0 - 149 mg/dL HDL Cholesterol 53 >39 mg/dL VLDL, calculated 19 5 - 40 mg/dL LDL, calculated 137 (H) 0 - 99 mg/dL Narrative Performed at:  [address]  [id number] : Pricilla Fulton MD, Phone:  [phone] HEMOGLOBIN A1C WITH EAG (Collected: 4/4/2019 11:11 AM) Result Value Ref Range Hemoglobin A1c 5.0 4.8 - 5.6 % Comment:  
            Prediabetes: 5.7 - 6.4 Diabetes: >6.4 Glycemic control for adults with diabetes: <7.0 Estimated average glucose 97 mg/dL Narrative Performed at:  [address]  [id number] : Pricilla Fulton MD, Phone:  [phone] TSH 3RD GENERATION (Collected: 4/4/2019 11:11 AM) Result Value Ref Range TSH 0.494 0.450 - 4.500 uIU/mL Narrative Performed at:  [address]  [id number] : Pricilla Fulton MD, Phone:  [phone] VITAMIN D, 25 HYDROXY (Collected: 4/4/2019 11:11 AM) Result Value Ref Range VITAMIN D, 25-HYDROXY 26.2 (L) 30.0 - 100.0 ng/mL Comment:  
   Vitamin D deficiency has been defined by the [facility] practice guideline as a 
level of serum 25-OH vitamin D less than 20 ng/mL (1,2). The Endocrine Society went on to further define vitamin D 
insufficiency as a level between 21 and 29 ng/mL (2). 1. IOM ([place] of Medicine). 2010. Dietary reference 
   intakes for calcium and D. [facility]: The 
   [employer].  
2. Toy MF, Faisal NC, Deborah CHEN, et al. 
   Evaluation, treatment, and prevention of vitamin D 
 deficiency: an Endocrine Society clinical practice 
   guideline. JCEM. 2011 Jul; 96(7):1911-30. Narrative Performed at:  [address]  [id number] : Adiel Larios MD, Phone:  [phone] URINALYSIS W/ RFLX MICROSCOPIC (Collected: 4/4/2019 11:11 AM) Result Value Ref Range Specific Gravity 1.016 1.005 - 1.030  
 pH (UA) 5.5 5.0 - 7.5 Color Yellow Yellow Appearance Clear Clear Leukocyte Esterase Negative Negative Protein Negative Negative/Trace Glucose Negative Negative Ketone Negative Negative Blood Negative Negative Bilirubin Negative Negative Urobilinogen 0.2 0.2 - 1.0 mg/dL Nitrites Negative Negative Microscopic Examination Comment Comment:  
   Microscopic not indicated and not performed. Narrative Performed at:  [address]  [id number] : Adiel Larios MD, Phone:  [phone] RECOMMENDATIONS: 
Work on diet and exercise. Continue with current  diet and medications. Please call me if you have any questions: [phone] Sincerely, Lupis Kelly MD

## 2019-05-24 NOTE — PROGRESS NOTES
Chief Complaint   Patient presents with    Follow-up    Numbness     hands and arms and legs     HPI:  Keaton Khan is a 41 y.o. AA male presents to follow-up for evaluation for worsening numbness of hands, arms and legs. Blood pressure is well controlled on Amlodipine. She does not has h/o CAD. Denies weakness of extremities, headaches. Review of Systems  As per hpi    Past Medical History:   Diagnosis Date    Hypertension     Rotator cuff injury      Past Surgical History:   Procedure Laterality Date    HX BACK SURGERY  1999    HX ORTHOPAEDIC      finger     HX SHOULDER ARTHROSCOPY       Social History     Socioeconomic History    Marital status: SINGLE     Spouse name: Not on file    Number of children: Not on file    Years of education: Not on file    Highest education level: Not on file   Tobacco Use    Smoking status: Current Every Day Smoker     Packs/day: 0.50     Years: 0.00     Pack years: 0.00    Smokeless tobacco: Never Used   Substance and Sexual Activity    Alcohol use: Yes     Comment: occasionally    Drug use: Yes     Types: Marijuana    Sexual activity: Yes     Partners: Female     Birth control/protection: None     Family History   Problem Relation Age of Onset    Asthma Mother     Hypertension Mother     Hypertension Father     Hypertension Paternal Uncle     Diabetes Paternal Grandmother      Current Outpatient Medications   Medication Sig Dispense Refill    amLODIPine (NORVASC) 5 mg tablet Take 1 Tab by mouth daily.  30 Tab 2     Allergies   Allergen Reactions    Morphine Nausea and Vomiting     Objective:  Visit Vitals  /63   Pulse 78   Temp 98.6 °F (37 °C) (Oral)   Resp 20   Ht 5' 7\" (1.702 m)   Wt 283 lb (128.4 kg)   SpO2 98%   BMI 44.32 kg/m²     Physical Exam:   General appearance - alert, well appearing in no distress  Mental status - alert, oriented to person, place, and time  EYE-PERRL, EOMI  Neck - supple, no significant adenopathy   Chest - clear to auscultation, no wheezes, rales or rhonchi  Heart - normal rate, regular rhythm, normal blood pressure  Abdomen - soft, nontender, nondistended, no organomegaly  Ext-peripheral pulses normal, no pedal edema  Neuro -alert, oriented, normal speech, no focal findings   Back-full range of motion, no tenderness, palpable spasm or pain on motion     Results for orders placed or performed in visit on 17/56/00   METABOLIC PANEL, COMPREHENSIVE   Result Value Ref Range    Glucose 92 65 - 99 mg/dL    BUN 15 6 - 24 mg/dL    Creatinine 1.42 (H) 0.76 - 1.27 mg/dL    GFR est non-AA 61 >59 mL/min/1.73    GFR est AA 70 >59 mL/min/1.73    BUN/Creatinine ratio 11 9 - 20    Sodium 138 134 - 144 mmol/L    Potassium 4.4 3.5 - 5.2 mmol/L    Chloride 102 96 - 106 mmol/L    CO2 25 20 - 29 mmol/L    Calcium 9.5 8.7 - 10.2 mg/dL    Protein, total 7.1 6.0 - 8.5 g/dL    Albumin 4.5 3.5 - 5.5 g/dL    GLOBULIN, TOTAL 2.6 1.5 - 4.5 g/dL    A-G Ratio 1.7 1.2 - 2.2    Bilirubin, total 0.5 0.0 - 1.2 mg/dL    Alk. phosphatase 44 39 - 117 IU/L    AST (SGOT) 18 0 - 40 IU/L    ALT (SGPT) 20 0 - 44 IU/L   CBC WITH AUTOMATED DIFF   Result Value Ref Range    WBC 5.3 3.4 - 10.8 x10E3/uL    RBC 4.94 4.14 - 5.80 x10E6/uL    HGB 15.1 13.0 - 17.7 g/dL    HCT 43.0 37.5 - 51.0 %    MCV 87 79 - 97 fL    MCH 30.6 26.6 - 33.0 pg    MCHC 35.1 31.5 - 35.7 g/dL    RDW 14.4 12.3 - 15.4 %    PLATELET 084 844 - 240 x10E3/uL    NEUTROPHILS 50 Not Estab. %    Lymphocytes 37 Not Estab. %    MONOCYTES 8 Not Estab. %    EOSINOPHILS 4 Not Estab. %    BASOPHILS 1 Not Estab. %    ABS. NEUTROPHILS 2.7 1.4 - 7.0 x10E3/uL    Abs Lymphocytes 2.0 0.7 - 3.1 x10E3/uL    ABS. MONOCYTES 0.4 0.1 - 0.9 x10E3/uL    ABS. EOSINOPHILS 0.2 0.0 - 0.4 x10E3/uL    ABS. BASOPHILS 0.0 0.0 - 0.2 x10E3/uL    IMMATURE GRANULOCYTES 0 Not Estab. %    ABS. IMM.  GRANS. 0.0 0.0 - 0.1 x10E3/uL   LIPID PANEL   Result Value Ref Range    Cholesterol, total 209 (H) 100 - 199 mg/dL    Triglyceride 97 0 - 149 mg/dL    HDL Cholesterol 53 >39 mg/dL    VLDL, calculated 19 5 - 40 mg/dL    LDL, calculated 137 (H) 0 - 99 mg/dL   HEMOGLOBIN A1C WITH EAG   Result Value Ref Range    Hemoglobin A1c 5.0 4.8 - 5.6 %    Estimated average glucose 97 mg/dL   TSH 3RD GENERATION   Result Value Ref Range    TSH 0.494 0.450 - 4.500 uIU/mL   VITAMIN D, 25 HYDROXY   Result Value Ref Range    VITAMIN D, 25-HYDROXY 26.2 (L) 30.0 - 100.0 ng/mL   URINALYSIS W/ RFLX MICROSCOPIC   Result Value Ref Range    Specific Gravity 1.016 1.005 - 1.030    pH (UA) 5.5 5.0 - 7.5    Color Yellow Yellow    Appearance Clear Clear    Leukocyte Esterase Negative Negative    Protein Negative Negative/Trace    Glucose Negative Negative    Ketone Negative Negative    Blood Negative Negative    Bilirubin Negative Negative    Urobilinogen 0.2 0.2 - 1.0 mg/dL    Nitrites Negative Negative    Microscopic Examination Comment      Assessment/Plan:  Diagnoses and all orders for this visit:    1. Vitamin D deficiency  -     cholecalciferol, VITAMIN D3, (VITAMIN D3) 5,000 unit tab tablet; Take 1 Tab by mouth daily. 2. Hypercholesterolemia    3. Hypertension, well controlled    4. Numbness and tingling in both hands  -     CT HEAD WO CONT; Future    5. Numbness and tingling of left lower extremity  -     CT HEAD WO CONT; Future    6. Headache disorder  -     CT HEAD WO CONT; Future      Patient Instructions        Learning About Vitamin D  Why is it important to get enough vitamin D? Your body needs vitamin D to absorb calcium. Calcium keeps your bones and muscles, including your heart, healthy and strong. If your muscles don't get enough calcium, they can cramp, hurt, or feel weak. You may have long-term (chronic) muscle aches and pains. If you don't get enough vitamin D throughout life, you have an increased chance of having thin and brittle bones (osteoporosis) in your later years. Children who don't get enough vitamin D may not grow as much as others their age.  They also have a chance of getting a rare disease called rickets. It causes weak bones. Vitamin D and calcium are added to many foods. And your body uses sunshine to make its own vitamin D. How much vitamin D do you need? The [place] of Medicine recommends that people ages 3 through 79 get 600 IU (international units) every day. Adults 71 and older need 800 IU every day. Blood tests for vitamin D can check your vitamin D level. But there is no standard normal range used by all laboratories. You're likely getting enough vitamin D if your levels are in the range of 20 to 50 ng/mL. How can you get more vitamin D? Foods that contain vitamin D include:  · [place], tuna, and mackerel. These are some of the best foods to eat when you need to get more vitamin D.  · Cheese, egg yolks, and beef liver. These foods have vitamin D in small amounts. · Milk, soy drinks, orange juice, yogurt, margarine, and some kinds of cereal have vitamin D added to them. Some people don't make vitamin D as well as others. They may have to take extra care in getting enough vitamin D. Things that reduce how much vitamin D your body makes include:  · Dark skin, such as many  Americans have. · Age, especially if you are older than 72. · Digestive problems, such as Crohn's or celiac disease. · Liver and kidney disease. Some people who do not get enough vitamin D may need supplements. Are there any risks from taking vitamin D?  · Too much vitamin D:  ? Can damage your kidneys. ? Can cause nausea and vomiting, constipation, and weakness. ? Raises the amount of calcium in your blood. If this happens, you can get confused or have an irregular heart rhythm. · Vitamin D may interact with other medicines. Tell your doctor about all of the medicines you take, including over-the-counter drugs, herbs, and pills. Tell your doctor about all of your current medical problems. Where can you learn more?   Go to http://reyes.info/. Enter [id number] in the search box to learn more about \"Learning About Vitamin D.\"  Current as of: March 28, 2018  Content Version: 11.9  © 5682-3981 [employer]. Care instructions adapted under license by [employer] (which disclaims liability or warranty for this information). If you have questions about a medical condition or this instruction, always ask your healthcare professional. [address] any warranty or liability for your use of this information. Follow-up and Dispositions    · Return if symptoms worsen or fail to improve, for routine follow up.

## 2019-05-24 NOTE — PATIENT INSTRUCTIONS
Learning About Vitamin D  Why is it important to get enough vitamin D? Your body needs vitamin D to absorb calcium. Calcium keeps your bones and muscles, including your heart, healthy and strong. If your muscles don't get enough calcium, they can cramp, hurt, or feel weak. You may have long-term (chronic) muscle aches and pains. If you don't get enough vitamin D throughout life, you have an increased chance of having thin and brittle bones (osteoporosis) in your later years. Children who don't get enough vitamin D may not grow as much as others their age. They also have a chance of getting a rare disease called rickets. It causes weak bones. Vitamin D and calcium are added to many foods. And your body uses sunshine to make its own vitamin D. How much vitamin D do you need? The [place] of Medicine recommends that people ages 3 through 79 get 600 IU (international units) every day. Adults 71 and older need 800 IU every day. Blood tests for vitamin D can check your vitamin D level. But there is no standard normal range used by all laboratories. You're likely getting enough vitamin D if your levels are in the range of 20 to 50 ng/mL. How can you get more vitamin D? Foods that contain vitamin D include:  · [place], tuna, and mackerel. These are some of the best foods to eat when you need to get more vitamin D.  · Cheese, egg yolks, and beef liver. These foods have vitamin D in small amounts. · Milk, soy drinks, orange juice, yogurt, margarine, and some kinds of cereal have vitamin D added to them. Some people don't make vitamin D as well as others. They may have to take extra care in getting enough vitamin D. Things that reduce how much vitamin D your body makes include:  · Dark skin, such as many  Americans have. · Age, especially if you are older than 72. · Digestive problems, such as Crohn's or celiac disease. · Liver and kidney disease.   Some people who do not get enough vitamin D may need supplements. Are there any risks from taking vitamin D?  · Too much vitamin D:  ? Can damage your kidneys. ? Can cause nausea and vomiting, constipation, and weakness. ? Raises the amount of calcium in your blood. If this happens, you can get confused or have an irregular heart rhythm. · Vitamin D may interact with other medicines. Tell your doctor about all of the medicines you take, including over-the-counter drugs, herbs, and pills. Tell your doctor about all of your current medical problems. Where can you learn more? Go to http://orly-zachery.info/. Enter [id number] in the search box to learn more about \"Learning About Vitamin D.\"  Current as of: March 28, 2018  Content Version: 11.9  © 5558-4229 [employer]. Care instructions adapted under license by [employer] (which disclaims liability or warranty for this information). If you have questions about a medical condition or this instruction, always ask your healthcare professional. [place] [address] any warranty or liability for your use of this information.

## 2019-06-01 ENCOUNTER — HOSPITAL ENCOUNTER (OUTPATIENT)
Dept: CT IMAGING | Age: 44
Discharge: HOME | End: 2019-06-01
Attending: INTERNAL MEDICINE
Payer: SUBSIDIZED

## 2019-06-01 DIAGNOSIS — R20.2 NUMBNESS AND TINGLING IN BOTH HANDS: ICD-10-CM

## 2019-06-01 DIAGNOSIS — R20.2 NUMBNESS AND TINGLING OF LEFT LOWER EXTREMITY: ICD-10-CM

## 2019-06-01 DIAGNOSIS — R20.0 NUMBNESS AND TINGLING IN BOTH HANDS: ICD-10-CM

## 2019-06-01 DIAGNOSIS — R20.0 NUMBNESS AND TINGLING OF LEFT LOWER EXTREMITY: ICD-10-CM

## 2019-06-01 DIAGNOSIS — R51.9 HEADACHE DISORDER: ICD-10-CM

## 2019-06-01 PROCEDURE — 70450 CT HEAD/BRAIN W/O DYE: CPT

## 2019-07-31 ENCOUNTER — APPOINTMENT (OUTPATIENT)
Dept: GENERAL RADIOLOGY | Age: 44
End: 2019-07-31
Attending: EMERGENCY MEDICINE
Payer: SUBSIDIZED

## 2019-07-31 ENCOUNTER — HOSPITAL ENCOUNTER (EMERGENCY)
Age: 44
Discharge: HOME | End: 2019-07-31
Attending: EMERGENCY MEDICINE
Payer: SUBSIDIZED

## 2019-07-31 VITALS
HEIGHT: 67 IN | WEIGHT: 271.17 LBS | HEART RATE: 78 BPM | BODY MASS INDEX: 42.56 KG/M2 | DIASTOLIC BLOOD PRESSURE: 62 MMHG | OXYGEN SATURATION: 98 % | SYSTOLIC BLOOD PRESSURE: 119 MMHG | RESPIRATION RATE: 22 BRPM | TEMPERATURE: 99.9 F

## 2019-07-31 DIAGNOSIS — R07.9 ACUTE CHEST PAIN: ICD-10-CM

## 2019-07-31 DIAGNOSIS — S29.011A CHEST WALL MUSCLE STRAIN, INITIAL ENCOUNTER: Primary | ICD-10-CM

## 2019-07-31 LAB
ALBUMIN SERPL-MCNC: 4.2 G/DL (ref 3.5–5)
ALBUMIN/GLOB SERPL: 1.1 {RATIO} (ref 1.1–2.2)
ALP SERPL-CCNC: 60 U/L (ref 45–117)
ALT SERPL-CCNC: 24 U/L (ref 12–78)
ANION GAP SERPL CALC-SCNC: 7 MMOL/L (ref 5–15)
AST SERPL-CCNC: 23 U/L (ref 15–37)
BASOPHILS # BLD: 0 K/UL (ref 0–0.1)
BASOPHILS NFR BLD: 0 % (ref 0–1)
BILIRUB SERPL-MCNC: 0.6 MG/DL (ref 0.2–1)
BUN SERPL-MCNC: 15 MG/DL (ref 6–20)
BUN/CREAT SERPL: 10 (ref 12–20)
CALCIUM SERPL-MCNC: 9.4 MG/DL (ref 8.5–10.1)
CHLORIDE SERPL-SCNC: 105 MMOL/L (ref 97–108)
CO2 SERPL-SCNC: 25 MMOL/L (ref 21–32)
CREAT SERPL-MCNC: 1.47 MG/DL (ref 0.7–1.3)
DIFFERENTIAL METHOD BLD: NORMAL
EOSINOPHIL # BLD: 0.2 K/UL (ref 0–0.4)
EOSINOPHIL NFR BLD: 2 % (ref 0–7)
ERYTHROCYTE [DISTWIDTH] IN BLOOD BY AUTOMATED COUNT: 12.7 % (ref 11.5–14.5)
GLOBULIN SER CALC-MCNC: 4 G/DL (ref 2–4)
GLUCOSE SERPL-MCNC: 75 MG/DL (ref 65–100)
HCT VFR BLD AUTO: 38.2 % (ref 36.6–50.3)
HGB BLD-MCNC: 13.9 G/DL (ref 12.1–17)
IMM GRANULOCYTES # BLD AUTO: 0 K/UL (ref 0–0.04)
IMM GRANULOCYTES NFR BLD AUTO: 0 % (ref 0–0.5)
LYMPHOCYTES # BLD: 2.2 K/UL (ref 0.8–3.5)
LYMPHOCYTES NFR BLD: 24 % (ref 12–49)
MCH RBC QN AUTO: 30.8 PG (ref 26–34)
MCHC RBC AUTO-ENTMCNC: 36.4 G/DL (ref 30–36.5)
MCV RBC AUTO: 84.7 FL (ref 80–99)
MONOCYTES # BLD: 1 K/UL (ref 0–1)
MONOCYTES NFR BLD: 11 % (ref 5–13)
NEUTS SEG # BLD: 5.8 K/UL (ref 1.8–8)
NEUTS SEG NFR BLD: 63 % (ref 32–75)
NRBC # BLD: 0 K/UL (ref 0–0.01)
NRBC BLD-RTO: 0 PER 100 WBC
PLATELET # BLD AUTO: 171 K/UL (ref 150–400)
PMV BLD AUTO: 9.7 FL (ref 8.9–12.9)
POTASSIUM SERPL-SCNC: 4.3 MMOL/L (ref 3.5–5.1)
PROT SERPL-MCNC: 8.2 G/DL (ref 6.4–8.2)
RBC # BLD AUTO: 4.51 M/UL (ref 4.1–5.7)
SODIUM SERPL-SCNC: 137 MMOL/L (ref 136–145)
TROPONIN I SERPL-MCNC: <0.05 NG/ML
WBC # BLD AUTO: 9.3 K/UL (ref 4.1–11.1)

## 2019-07-31 PROCEDURE — 36415 COLL VENOUS BLD VENIPUNCTURE: CPT

## 2019-07-31 PROCEDURE — 84484 ASSAY OF TROPONIN QUANT: CPT

## 2019-07-31 PROCEDURE — 71046 X-RAY EXAM CHEST 2 VIEWS: CPT

## 2019-07-31 PROCEDURE — 85025 COMPLETE CBC W/AUTO DIFF WBC: CPT

## 2019-07-31 PROCEDURE — 93005 ELECTROCARDIOGRAM TRACING: CPT

## 2019-07-31 PROCEDURE — 99284 EMERGENCY DEPT VISIT MOD MDM: CPT

## 2019-07-31 PROCEDURE — 80053 COMPREHEN METABOLIC PANEL: CPT

## 2019-07-31 RX ORDER — SODIUM CHLORIDE 0.9 % (FLUSH) 0.9 %
5-40 SYRINGE (ML) INJECTION AS NEEDED
Status: DISCONTINUED | OUTPATIENT
Start: 2019-07-31 | End: 2019-08-01 | Stop reason: HOSPADM

## 2019-07-31 RX ORDER — SODIUM CHLORIDE 0.9 % (FLUSH) 0.9 %
5-40 SYRINGE (ML) INJECTION EVERY 8 HOURS
Status: DISCONTINUED | OUTPATIENT
Start: 2019-07-31 | End: 2019-08-01 | Stop reason: HOSPADM

## 2019-08-01 LAB
ATRIAL RATE: 82 BPM
CALCULATED P AXIS, ECG09: 39 DEGREES
CALCULATED R AXIS, ECG10: 40 DEGREES
CALCULATED T AXIS, ECG11: 5 DEGREES
DIAGNOSIS, 93000: NORMAL
P-R INTERVAL, ECG05: 138 MS
Q-T INTERVAL, ECG07: 324 MS
QRS DURATION, ECG06: 74 MS
QTC CALCULATION (BEZET), ECG08: 378 MS
VENTRICULAR RATE, ECG03: 82 BPM

## 2019-08-01 NOTE — ED NOTES
Patient discharged by MD, AVS and education given. IV removed from [address] w/o difficulty, catheter intact, no redness/swelling. Patient ambulated off unit,home via private car.

## 2019-08-01 NOTE — DISCHARGE INSTRUCTIONS
Chest x-ray is normal today. Your EKG and labs are normal and do not suggest any evidence of heart pain. Recommend Tylenol, ibuprofen, or naproxen at home for pain. May apply warm compress or heating pad to the area for pain relief.

## 2019-08-01 NOTE — ED PROVIDER NOTES
EMERGENCY DEPARTMENT HISTORY AND PHYSICAL EXAM      Date: 7/31/2019  Patient Name: John Quintero    History of Presenting Illness     Chief Complaint   Patient presents with    Chest Pain     right sided chest pain / RUQ pain - onset of sxs 1 hour ago while at rest - hx of HTN - Denies fevers / [place] Rhe[place] / N / V /  D / similar sxs in the past       History Provided By: Patient    HPI: John Quintero, 41 y.o. male  presents to the ED with cc of right side chest pain. Pain started about 1 to 2 hours prior to arrival.  It occurred suddenly with a sneeze at home. Patient did not have any pain prior to this. No coughing today. No shortness of breath today. No nausea vomiting or diarrhea. No fevers or chills. No pain or swelling in his legs. There are no other complaints, changes, or physical findings at this time. PCP: Batool Naik MD    No current facility-administered medications on file prior to encounter. Current Outpatient Medications on File Prior to Encounter   Medication Sig Dispense Refill    cholecalciferol, VITAMIN D3, (VITAMIN D3) 5,000 unit tab tablet Take 1 Tab by mouth daily. 90 Tab 1    amLODIPine (NORVASC) 5 mg tablet Take 1 Tab by mouth daily.  27 Tab 2       Past History     Past Medical History:  Past Medical History:   Diagnosis Date    Hypertension     Rotator cuff injury        Past Surgical History:  Past Surgical History:   Procedure Laterality Date    HX BACK SURGERY  1999    HX ORTHOPAEDIC      finger     HX SHOULDER ARTHROSCOPY         Family History:  Family History   Problem Relation Age of Onset    Asthma Mother     Hypertension Mother     Hypertension Father     Hypertension Paternal Uncle     Diabetes Paternal Grandmother        Social History:  Social History     Tobacco Use    Smoking status: Current Every Day Smoker     Packs/day: 0.50     Years: 0.00     Pack years: 0.00    Smokeless tobacco: Never Used   Substance Use Topics    Alcohol use: Yes     Comment: occasionally    Drug use: Yes     Types: Marijuana       Allergies: Allergies   Allergen Reactions    Morphine Nausea and Vomiting         Review of Systems   Review of Systems   Constitutional: Negative for chills and fever. HENT: Negative for congestion, ear pain, rhinorrhea, sore throat and trouble swallowing. Eyes: Negative for visual disturbance. Respiratory: Negative for cough, chest tightness and shortness of breath. Cardiovascular: Positive for chest pain. Negative for palpitations. Gastrointestinal: Negative for abdominal pain, blood in stool, constipation, diarrhea, nausea and vomiting. Genitourinary: Negative for decreased urine volume, difficulty urinating, dysuria and frequency. Musculoskeletal: Negative for back pain and neck pain. Skin: Negative for color change and rash. Neurological: Negative for dizziness, weakness, light-headedness and headaches. Physical Exam   Physical Exam   Constitutional: He is oriented to person, place, and time. He appears well-developed and well-nourished. He does not appear ill. No distress. HENT:   Mouth/Throat: Oropharynx is clear and moist.   Eyes: Conjunctivae are normal.   Neck: Neck supple. Cardiovascular: Normal rate and regular rhythm. Pulmonary/Chest: Effort normal and breath sounds normal. No accessory muscle usage. No respiratory distress. Abdominal: Soft. He exhibits no distension. There is no tenderness. Lymphadenopathy:     He has no cervical adenopathy. Neurological: He is alert and oriented to person, place, and time. He has normal strength. No cranial nerve deficit or sensory deficit. Skin: Skin is warm and dry. Nursing note and vitals reviewed.       Diagnostic Study Results     Labs -     Recent Results (from the past 24 hour(s))   EKG, 12 LEAD, INITIAL    Collection Time: 07/31/19  8:01 PM   Result Value Ref Range    Ventricular Rate 82 BPM    Atrial Rate 82 BPM    P-R Interval 138 ms QRS Duration 74 ms    Q-T Interval 324 ms    QTC Calculation (Bezet) 378 ms    Calculated P Axis 39 degrees    Calculated R Axis 40 degrees    Calculated T Axis 5 degrees    Diagnosis       Normal sinus rhythm  Cannot rule out Inferior infarct , age undetermined  No previous ECGs available     METABOLIC PANEL, COMPREHENSIVE    Collection Time: 07/31/19  8:38 PM   Result Value Ref Range    Sodium 137 136 - 145 mmol/L    Potassium 4.3 3.5 - 5.1 mmol/L    Chloride 105 97 - 108 mmol/L    CO2 25 21 - 32 mmol/L    Anion gap 7 5 - 15 mmol/L    Glucose 75 65 - 100 mg/dL    BUN 15 6 - 20 MG/DL    Creatinine 1.47 (H) 0.70 - 1.30 MG/DL    BUN/Creatinine ratio 10 (L) 12 - 20      GFR est AA >60 >60 ml/min/1.73m2    GFR est non-AA 53 (L) >60 ml/min/1.73m2    Calcium 9.4 8.5 - 10.1 MG/DL    Bilirubin, total 0.6 0.2 - 1.0 MG/DL    ALT (SGPT) 24 12 - 78 U/L    AST (SGOT) 23 15 - 37 U/L    Alk. phosphatase 60 45 - 117 U/L    Protein, total 8.2 6.4 - 8.2 g/dL    Albumin 4.2 3.5 - 5.0 g/dL    Globulin 4.0 2.0 - 4.0 g/dL    A-G Ratio 1.1 1.1 - 2.2     CBC WITH AUTOMATED DIFF    Collection Time: 07/31/19  8:38 PM   Result Value Ref Range    WBC 9.3 4.1 - 11.1 K/uL    RBC 4.51 4.10 - 5.70 M/uL    HGB 13.9 12.1 - 17.0 g/dL    HCT 38.2 36.6 - 50.3 %    MCV 84.7 80.0 - 99.0 FL    MCH 30.8 26.0 - 34.0 PG    MCHC 36.4 30.0 - 36.5 g/dL    RDW 12.7 11.5 - 14.5 %    PLATELET 417 223 - 621 K/uL    MPV 9.7 8.9 - 12.9 FL    NRBC 0.0 0  WBC    ABSOLUTE NRBC 0.00 0.00 - 0.01 K/uL    NEUTROPHILS 63 32 - 75 %    LYMPHOCYTES 24 12 - 49 %    MONOCYTES 11 5 - 13 %    EOSINOPHILS 2 0 - 7 %    BASOPHILS 0 0 - 1 %    IMMATURE GRANULOCYTES 0 0.0 - 0.5 %    ABS. NEUTROPHILS 5.8 1.8 - 8.0 K/UL    ABS. LYMPHOCYTES 2.2 0.8 - 3.5 K/UL    ABS. MONOCYTES 1.0 0.0 - 1.0 K/UL    ABS. EOSINOPHILS 0.2 0.0 - 0.4 K/UL    ABS. BASOPHILS 0.0 0.0 - 0.1 K/UL    ABS. IMM.  GRANS. 0.0 0.00 - 0.04 K/UL    DF AUTOMATED     TROPONIN I    Collection Time: 07/31/19  8:38 PM Result Value Ref Range    Troponin-I, Qt. <0.05 <0.05 ng/mL       Radiologic Studies -   XR CHEST PA LAT   Final Result   IMPRESSION:   No acute process. CT Results  (Last 48 hours)    None        CXR Results  (Last 48 hours)               07/31/19 2201  XR CHEST PA LAT Final result    Impression:  IMPRESSION:   No acute process. Narrative:  INDICATION:   right chest pain        EXAM:  PA and Lateral Chest Radiographs       COMPARISON: None       FINDINGS:       PA and lateral views of the chest demonstrate a normal cardiomediastinal   silhouette. The lungs are hypoinspiratory. There is no edema, effusion,   consolidation, or pneumothorax. The osseous structures are unremarkable. Medical Decision Making   I am the first provider for this patient. I reviewed the vital signs, available nursing notes, past medical history, past surgical history, family history and social history. Vital Signs-Reviewed the patient's vital signs. Patient Vitals for the past 24 hrs:   Temp Pulse Resp BP SpO2   07/31/19 2145 -- 78 22 119/62 98 %   07/31/19 2100 -- 80 17 100/58 97 %   07/31/19 2032 -- 85 20 112/65 99 %   07/31/19 1954 99.9 °F (37.7 °C) 99 18 142/82 99 %     EKG interpretation: (Preliminary)  Rhythm: normal sinus rhythm; and regular . Rate (approx.): 82; Axis: normal; UT interval: normal; QRS interval: normal ; ST/T wave: non-specific changes. Records Reviewed: Nursing Notes and Old Medical Records    Provider Notes (Medical Decision Making):   Patient presents with right side chest wall pain that started acutely with a sneeze at home. Chest x-ray does not show any evidence of spontaneous pneumothorax. EKG is unremarkable. Story is very atypical for ACS. One set of cardiac enzymes are normal.  He has reproducible tenderness on exam.  Recommend over-the-counter pain medicines. Warm compress may help further with pain. Follow-up with primary care doctor.   Return if worse.    ED Course:   Initial assessment performed. The patients presenting problems have been discussed, and they are in agreement with the care plan formulated and outlined with them. I have encouraged them to ask questions as they arise throughout their visit. Orders Placed This Encounter    XR CHEST PA LAT    Hold Sample    METABOLIC PANEL, COMPREHENSIVE    CBC W/ AUTOMATED DIFF    TROPONIN I    EKG 12 LEAD INITIAL    INSERT PERIPHERAL IV ONE TIME STAT    sodium chloride (NS) flush 5-40 mL    sodium chloride (NS) flush 5-40 mL    sodium chloride (NS) flush 5-40 mL         Critical Care Time:   0    Disposition:  Discharge  10:17 PM    The patient's emergency department evaluation is now complete. I have reviewed all labs, imaging, and pertinent information. I have discussed all results with the patient and/or family. Based on our evaluation today I do believe that the patient is safe to be discharged home. The patient has been provided with at home instructions that are pertinent to their complaint today, although these may not be specific to the exact diagnosis. I have reviewed the patient's home medications and attempted to reconcile if not already done so by pharmacy or nursing staff. I have discussed all new prescriptions with the patient. The patient has been encouraged to follow-up with primary care doctor and/or specialist, and these have been discussed with the patient. The patient has been advised that they may return to the emergency department if they have any worsening symptoms and or new symptoms that are of concern to them. Verbal discharge instructions may have also been provided to the patient that may not be specifically contained in the written discharge instructions. The patient has been given opportunity to ask questions prior to discharge. PLAN:  1. Current Discharge Medication List        2.    Follow-up Information     Follow up With Specialties Details Why Contact Info    Pan Lucia MD Internal Medicine Schedule an appointment as soon as possible for a visit in 1 week  [address]  [phone]          Return to ED if worse     Diagnosis     Clinical Impression:   1. Chest wall muscle strain, initial encounter    2. Acute chest pain            This note will not be viewable in MyChart.

## 2019-08-01 NOTE — ED TRIAGE NOTES
Patient arrives with c/o R sided chest pain that radiates down through ribs. He states it was sudden onset today and continuous. Denies any nausea, diaphoresis, dyspnea. Patient denies any cardiac hx, only hx of HTN. Family at bedside, attached to continuous heart monitor. Will continue to monitor.

## 2019-08-08 ENCOUNTER — HOSPITAL ENCOUNTER (INPATIENT)
Age: 44
LOS: 2 days | Discharge: HOME | DRG: 134 | End: 2019-08-10
Attending: EMERGENCY MEDICINE | Admitting: INTERNAL MEDICINE
Payer: MEDICAID

## 2019-08-08 ENCOUNTER — APPOINTMENT (OUTPATIENT)
Dept: CT IMAGING | Age: 44
DRG: 134 | End: 2019-08-08
Attending: PHYSICIAN ASSISTANT
Payer: MEDICAID

## 2019-08-08 ENCOUNTER — APPOINTMENT (OUTPATIENT)
Dept: GENERAL RADIOLOGY | Age: 44
DRG: 134 | End: 2019-08-08
Attending: EMERGENCY MEDICINE
Payer: MEDICAID

## 2019-08-08 DIAGNOSIS — I26.99 BILATERAL PULMONARY EMBOLISM (HCC): Primary | ICD-10-CM

## 2019-08-08 LAB
ALBUMIN SERPL-MCNC: 3.4 G/DL (ref 3.5–5)
ALBUMIN/GLOB SERPL: 0.7 {RATIO} (ref 1.1–2.2)
ALP SERPL-CCNC: 75 U/L (ref 45–117)
ALT SERPL-CCNC: 55 U/L (ref 12–78)
ANION GAP SERPL CALC-SCNC: 4 MMOL/L (ref 5–15)
APTT PPP: 28.2 SEC (ref 22.1–32)
AST SERPL-CCNC: 27 U/L (ref 15–37)
ATRIAL RATE: 97 BPM
BASOPHILS # BLD: 0 K/UL (ref 0–0.1)
BASOPHILS NFR BLD: 0 % (ref 0–1)
BILIRUB SERPL-MCNC: 0.6 MG/DL (ref 0.2–1)
BUN SERPL-MCNC: 14 MG/DL (ref 6–20)
BUN/CREAT SERPL: 10 (ref 12–20)
CALCIUM SERPL-MCNC: 9.1 MG/DL (ref 8.5–10.1)
CALCULATED P AXIS, ECG09: 40 DEGREES
CALCULATED R AXIS, ECG10: 36 DEGREES
CALCULATED T AXIS, ECG11: 32 DEGREES
CHLORIDE SERPL-SCNC: 104 MMOL/L (ref 97–108)
CK MB CFR SERPL CALC: NORMAL % (ref 0–2.5)
CK MB SERPL-MCNC: <1 NG/ML (ref 5–25)
CK SERPL-CCNC: 88 U/L (ref 39–308)
CO2 SERPL-SCNC: 30 MMOL/L (ref 21–32)
CREAT SERPL-MCNC: 1.39 MG/DL (ref 0.7–1.3)
D DIMER PPP FEU-MCNC: 6.89 MG/L FEU (ref 0–0.65)
DIAGNOSIS, 93000: NORMAL
DIFFERENTIAL METHOD BLD: ABNORMAL
EOSINOPHIL # BLD: 0.1 K/UL (ref 0–0.4)
EOSINOPHIL NFR BLD: 1 % (ref 0–7)
ERYTHROCYTE [DISTWIDTH] IN BLOOD BY AUTOMATED COUNT: 12.2 % (ref 11.5–14.5)
GLOBULIN SER CALC-MCNC: 4.7 G/DL (ref 2–4)
GLUCOSE SERPL-MCNC: 97 MG/DL (ref 65–100)
HCT VFR BLD AUTO: 38.1 % (ref 36.6–50.3)
HGB BLD-MCNC: 13.1 G/DL (ref 12.1–17)
IMM GRANULOCYTES # BLD AUTO: 0 K/UL (ref 0–0.04)
IMM GRANULOCYTES NFR BLD AUTO: 0 % (ref 0–0.5)
LYMPHOCYTES # BLD: 1.5 K/UL (ref 0.8–3.5)
LYMPHOCYTES NFR BLD: 13 % (ref 12–49)
MCH RBC QN AUTO: 29.4 PG (ref 26–34)
MCHC RBC AUTO-ENTMCNC: 34.4 G/DL (ref 30–36.5)
MCV RBC AUTO: 85.4 FL (ref 80–99)
MONOCYTES # BLD: 1.2 K/UL (ref 0–1)
MONOCYTES NFR BLD: 11 % (ref 5–13)
NEUTS SEG # BLD: 8.4 K/UL (ref 1.8–8)
NEUTS SEG NFR BLD: 75 % (ref 32–75)
NRBC # BLD: 0 K/UL (ref 0–0.01)
NRBC BLD-RTO: 0 PER 100 WBC
P-R INTERVAL, ECG05: 130 MS
PLATELET # BLD AUTO: 225 K/UL (ref 150–400)
PMV BLD AUTO: 9.5 FL (ref 8.9–12.9)
POTASSIUM SERPL-SCNC: 4.4 MMOL/L (ref 3.5–5.1)
PROT SERPL-MCNC: 8.1 G/DL (ref 6.4–8.2)
Q-T INTERVAL, ECG07: 306 MS
QRS DURATION, ECG06: 82 MS
QTC CALCULATION (BEZET), ECG08: 388 MS
RBC # BLD AUTO: 4.46 M/UL (ref 4.1–5.7)
SODIUM SERPL-SCNC: 138 MMOL/L (ref 136–145)
THERAPEUTIC RANGE,PTTT: NORMAL SECS (ref 58–77)
TROPONIN I SERPL-MCNC: <0.05 NG/ML
VENTRICULAR RATE, ECG03: 97 BPM
WBC # BLD AUTO: 11.2 K/UL (ref 4.1–11.1)

## 2019-08-08 PROCEDURE — 36415 COLL VENOUS BLD VENIPUNCTURE: CPT

## 2019-08-08 PROCEDURE — 82550 ASSAY OF CK (CPK): CPT

## 2019-08-08 PROCEDURE — 71046 X-RAY EXAM CHEST 2 VIEWS: CPT

## 2019-08-08 PROCEDURE — 84484 ASSAY OF TROPONIN QUANT: CPT

## 2019-08-08 PROCEDURE — 80053 COMPREHEN METABOLIC PANEL: CPT

## 2019-08-08 PROCEDURE — 85379 FIBRIN DEGRADATION QUANT: CPT

## 2019-08-08 PROCEDURE — 94640 AIRWAY INHALATION TREATMENT: CPT

## 2019-08-08 PROCEDURE — 93005 ELECTROCARDIOGRAM TRACING: CPT

## 2019-08-08 PROCEDURE — 74011250636 HC RX REV CODE- 250/636: Performed by: PHYSICIAN ASSISTANT

## 2019-08-08 PROCEDURE — 85730 THROMBOPLASTIN TIME PARTIAL: CPT

## 2019-08-08 PROCEDURE — 65660000000 HC RM CCU STEPDOWN

## 2019-08-08 PROCEDURE — 74011636320 HC RX REV CODE- 636/320: Performed by: EMERGENCY MEDICINE

## 2019-08-08 PROCEDURE — 96374 THER/PROPH/DIAG INJ IV PUSH: CPT

## 2019-08-08 PROCEDURE — 85025 COMPLETE CBC W/AUTO DIFF WBC: CPT

## 2019-08-08 PROCEDURE — 74011000250 HC RX REV CODE- 250: Performed by: PHYSICIAN ASSISTANT

## 2019-08-08 PROCEDURE — 71275 CT ANGIOGRAPHY CHEST: CPT

## 2019-08-08 PROCEDURE — 99283 EMERGENCY DEPT VISIT LOW MDM: CPT

## 2019-08-08 RX ORDER — ACETAMINOPHEN 325 MG/1
650 TABLET ORAL
Status: DISCONTINUED | OUTPATIENT
Start: 2019-08-08 | End: 2019-08-10 | Stop reason: HOSPADM

## 2019-08-08 RX ORDER — SODIUM CHLORIDE 0.9 % (FLUSH) 0.9 %
10 SYRINGE (ML) INJECTION
Status: COMPLETED | OUTPATIENT
Start: 2019-08-08 | End: 2019-08-08

## 2019-08-08 RX ORDER — ALBUTEROL SULFATE 0.83 MG/ML
2.5 SOLUTION RESPIRATORY (INHALATION)
Status: COMPLETED | OUTPATIENT
Start: 2019-08-08 | End: 2019-08-08

## 2019-08-08 RX ORDER — DEXAMETHASONE SODIUM PHOSPHATE 4 MG/ML
10 INJECTION, SOLUTION INTRA-ARTICULAR; INTRALESIONAL; INTRAMUSCULAR; INTRAVENOUS; SOFT TISSUE
Status: DISCONTINUED | OUTPATIENT
Start: 2019-08-08 | End: 2019-08-08

## 2019-08-08 RX ORDER — DOCUSATE SODIUM 100 MG/1
100 CAPSULE, LIQUID FILLED ORAL
Status: DISCONTINUED | OUTPATIENT
Start: 2019-08-08 | End: 2019-08-10 | Stop reason: HOSPADM

## 2019-08-08 RX ORDER — SODIUM CHLORIDE 0.9 % (FLUSH) 0.9 %
5-40 SYRINGE (ML) INJECTION AS NEEDED
Status: DISCONTINUED | OUTPATIENT
Start: 2019-08-08 | End: 2019-08-10 | Stop reason: HOSPADM

## 2019-08-08 RX ORDER — SODIUM CHLORIDE 0.9 % (FLUSH) 0.9 %
5-40 SYRINGE (ML) INJECTION EVERY 8 HOURS
Status: DISCONTINUED | OUTPATIENT
Start: 2019-08-08 | End: 2019-08-10 | Stop reason: HOSPADM

## 2019-08-08 RX ORDER — HEPARIN SODIUM 5000 [USP'U]/ML
40 INJECTION, SOLUTION INTRAVENOUS; SUBCUTANEOUS AS NEEDED
Status: DISCONTINUED | OUTPATIENT
Start: 2019-08-08 | End: 2019-08-10

## 2019-08-08 RX ORDER — HEPARIN SODIUM 10000 [USP'U]/100ML
12-36 INJECTION, SOLUTION INTRAVENOUS
Status: DISCONTINUED | OUTPATIENT
Start: 2019-08-08 | End: 2019-08-10

## 2019-08-08 RX ORDER — ONDANSETRON 2 MG/ML
4 INJECTION INTRAMUSCULAR; INTRAVENOUS
Status: DISCONTINUED | OUTPATIENT
Start: 2019-08-08 | End: 2019-08-10 | Stop reason: HOSPADM

## 2019-08-08 RX ORDER — MELATONIN
5000 DAILY
Status: DISCONTINUED | OUTPATIENT
Start: 2019-08-09 | End: 2019-08-10 | Stop reason: HOSPADM

## 2019-08-08 RX ORDER — HEPARIN SODIUM 5000 [USP'U]/ML
80 INJECTION, SOLUTION INTRAVENOUS; SUBCUTANEOUS ONCE
Status: COMPLETED | OUTPATIENT
Start: 2019-08-08 | End: 2019-08-08

## 2019-08-08 RX ORDER — BISMUTH SUBSALICYLATE 262 MG
1 TABLET,CHEWABLE ORAL DAILY
COMMUNITY
End: 2019-08-10

## 2019-08-08 RX ORDER — DEXAMETHASONE SODIUM PHOSPHATE 4 MG/ML
10 INJECTION, SOLUTION INTRA-ARTICULAR; INTRALESIONAL; INTRAMUSCULAR; INTRAVENOUS; SOFT TISSUE
Status: COMPLETED | OUTPATIENT
Start: 2019-08-08 | End: 2019-08-08

## 2019-08-08 RX ORDER — KETOROLAC TROMETHAMINE 30 MG/ML
15 INJECTION, SOLUTION INTRAMUSCULAR; INTRAVENOUS
Status: COMPLETED | OUTPATIENT
Start: 2019-08-08 | End: 2019-08-08

## 2019-08-08 RX ORDER — HEPARIN SODIUM 5000 [USP'U]/ML
80 INJECTION, SOLUTION INTRAVENOUS; SUBCUTANEOUS AS NEEDED
Status: DISCONTINUED | OUTPATIENT
Start: 2019-08-08 | End: 2019-08-10

## 2019-08-08 RX ADMIN — IOPAMIDOL 100 ML: 755 INJECTION, SOLUTION INTRAVENOUS at 16:08

## 2019-08-08 RX ADMIN — Medication 10 ML: at 21:41

## 2019-08-08 RX ADMIN — HEPARIN SODIUM AND DEXTROSE 12 UNITS/KG/HR: 10000; 5 INJECTION INTRAVENOUS at 17:33

## 2019-08-08 RX ADMIN — Medication 10 ML: at 16:08

## 2019-08-08 RX ADMIN — ALBUTEROL SULFATE 2.5 MG: 2.5 SOLUTION RESPIRATORY (INHALATION) at 15:09

## 2019-08-08 RX ADMIN — Medication 10 ML: at 15:10

## 2019-08-08 RX ADMIN — KETOROLAC TROMETHAMINE 15 MG: 30 INJECTION, SOLUTION INTRAMUSCULAR at 17:33

## 2019-08-08 RX ADMIN — DEXAMETHASONE SODIUM PHOSPHATE 10 MG: 4 INJECTION, SOLUTION INTRAMUSCULAR; INTRAVENOUS at 15:09

## 2019-08-08 RX ADMIN — SODIUM CHLORIDE 1000 ML: 900 INJECTION, SOLUTION INTRAVENOUS at 16:17

## 2019-08-08 RX ADMIN — HEPARIN SODIUM 9650 UNITS: 5000 INJECTION INTRAVENOUS; SUBCUTANEOUS at 17:33

## 2019-08-08 NOTE — ED PROVIDER NOTES
EMERGENCY DEPARTMENT HISTORY AND PHYSICAL EXAM      Date: 8/8/2019  Patient Name: John Quintero    History of Presenting Illness     Chief Complaint   Patient presents with    Rib Pain     Pain at right and left rib area with SOB worse since yesterday while playing ball. Fatigue reported with exertion. History Provided By: Patient    HPI: John Quintero, 41 y.o. male with PMHx significant for HTN, presents ambulatory to the ED with cc of chest pain and BORDEN x 1 week. Patient states one week ago he was visiting a friend, when he walked back to his car he started with chest pain. He notes progressive SOB with performing physical activity with his daughter on 8/5/19. On 8/7/19 he tried to climb some steps and noted he had to sit down for 5 minutes to catch his breath. Patient reports stopping Testosterone \"months ago\", he did recently drive to [place] to see a family member. Patient also reports associated productive cough, rhinorrhea, and wheezing this morning, noting he has chronic \"sinus issues\" Patient denies hemoptysis, trauma, ha, abdominal pain, N/V/D, calf pain, calf swelling, and history of PE and DVT. There are no other complaints, changes, or physical findings at this time. PCP: Batool Naik MD    No current facility-administered medications on file prior to encounter. Current Outpatient Medications on File Prior to Encounter   Medication Sig Dispense Refill    multivitamin (ONE A DAY) tablet Take 1 Tab by mouth daily.  docosahexanoic acid/epa (FISH OIL PO) Take 1 Tab by mouth daily.  COLLAGEN Take 1 Tab by mouth daily.  cholecalciferol, VITAMIN D3, (VITAMIN D3) 5,000 unit tab tablet Take 1 Tab by mouth daily.  80 Tab 1       Past History     Past Medical History:  Past Medical History:   Diagnosis Date    Hypertension     Rotator cuff injury        Past Surgical History:  Past Surgical History:   Procedure Laterality Date    HX BACK SURGERY  1999    HX ORTHOPAEDIC      finger     HX SHOULDER ARTHROSCOPY         Family History:  Family History   Problem Relation Age of Onset    Asthma Mother     Hypertension Mother     Hypertension Father     Hypertension Paternal Uncle     Diabetes Paternal Grandmother        Social History:  Social History     Tobacco Use    Smoking status: Former Smoker     Packs/day: 0.50     Years: 0.00     Pack years: 0.00    Smokeless tobacco: Never Used   Substance Use Topics    Alcohol use: Yes     Comment: occasionally    Drug use: Yes     Types: Marijuana       Allergies: Allergies   Allergen Reactions    Morphine Nausea and Vomiting         Review of Systems   Review of Systems   Constitutional: Negative. Negative for chills and fever. HENT: Positive for rhinorrhea. Negative for sore throat. Eyes: Negative. Negative for visual disturbance. Respiratory: Positive for shortness of breath and wheezing. Negative for cough and chest tightness. Cardiovascular: Positive for chest pain. Negative for palpitations. Gastrointestinal: Negative. Negative for abdominal pain, constipation, diarrhea, nausea and vomiting. Genitourinary: Negative. Negative for dysuria and hematuria. Musculoskeletal: Negative. Negative for arthralgias and myalgias. Skin: Negative. Negative for rash. Allergic/Immunologic: Negative. Negative for environmental allergies and food allergies. Neurological: Negative. Negative for headaches. Psychiatric/Behavioral: Negative. Negative for suicidal ideas. Physical Exam   Physical Exam   Constitutional: He is oriented to person, place, and time. He appears well-developed and well-nourished. No distress. Patient is awake and alert, in NAD but noted to be mildly short of breath with communicating. Pt with elevated BMI. HENT:   Head: Normocephalic and atraumatic.    Right Ear: External ear normal.   Left Ear: External ear normal.   Nose: Nose normal.   Mouth/Throat: Oropharynx is clear and moist. No oropharyngeal exudate. Eyes: Pupils are equal, round, and reactive to light. Conjunctivae and EOM are normal. Right eye exhibits no discharge. Left eye exhibits no discharge. Neck: Normal range of motion. Neck supple. No tracheal deviation present. Cervical Spine: No erythema, edema, step offs, or obvious bony deformity. No TTP. Nl ROM, Nl ROM against resistance. Cardiovascular: Normal rate, regular rhythm, normal heart sounds and intact distal pulses. Exam reveals no gallop and no friction rub. No murmur heard. No calf TTP, edema, or streaking b/l. Pulmonary/Chest: Effort normal. No respiratory distress. He has wheezes. He has no rales. He exhibits tenderness (see diagram in red). Scant, intermittent wheeze noted throughout L lung. Abdominal: Soft. Bowel sounds are normal. There is no tenderness. There is no rebound. Difficulty assessing distention and mass secondary to body habitus. Musculoskeletal: He exhibits tenderness. He exhibits no edema. Spine: No erythema, edema, step offs, or obvious bony deformity. No TTP. Nl ROM. No muscle spasm. Neurological: He is alert and oriented to person, place, and time. No cranial nerve deficit. He exhibits normal muscle tone. Coordination normal.   CN 2-12 intact. Neuro of upper extremities intact b/l. Neuro of lower extremities intact b/l. Sensation of upper and lower extremities intact b/l. Strength 5/5 of upper and lower extremities b/l. Skin: Skin is warm and dry. No rash noted. He is not diaphoretic. No erythema. No pallor. Psychiatric: He has a normal mood and affect. His behavior is normal. Judgment and thought content normal.   Nursing note and vitals reviewed.       Diagnostic Study Results     Labs -     Recent Results (from the past 12 hour(s))   EKG, 12 LEAD, INITIAL    Collection Time: 08/08/19 12:26 PM   Result Value Ref Range    Ventricular Rate 97 BPM    Atrial Rate 97 BPM    P-R Interval 130 ms QRS Duration 82 ms    Q-T Interval 306 ms    QTC Calculation (Bezet) 388 ms    Calculated P Axis 40 degrees    Calculated R Axis 36 degrees    Calculated T Axis 32 degrees    Diagnosis       Normal sinus rhythm  Normal ECG  When compared with ECG of 31-JUL-2019 20:01,  Minimal criteria for Inferior infarct are no longer present  T wave amplitude has decreased in Lateral leads     CBC WITH AUTOMATED DIFF    Collection Time: 08/08/19  2:29 PM   Result Value Ref Range    WBC 11.2 (H) 4.1 - 11.1 K/uL    RBC 4.46 4.10 - 5.70 M/uL    HGB 13.1 12.1 - 17.0 g/dL    HCT 38.1 36.6 - 50.3 %    MCV 85.4 80.0 - 99.0 FL    MCH 29.4 26.0 - 34.0 PG    MCHC 34.4 30.0 - 36.5 g/dL    RDW 12.2 11.5 - 14.5 %    PLATELET 717 282 - 114 K/uL    MPV 9.5 8.9 - 12.9 FL    NRBC 0.0 0  WBC    ABSOLUTE NRBC 0.00 0.00 - 0.01 K/uL    NEUTROPHILS 75 32 - 75 %    LYMPHOCYTES 13 12 - 49 %    MONOCYTES 11 5 - 13 %    EOSINOPHILS 1 0 - 7 %    BASOPHILS 0 0 - 1 %    IMMATURE GRANULOCYTES 0 0.0 - 0.5 %    ABS. NEUTROPHILS 8.4 (H) 1.8 - 8.0 K/UL    ABS. LYMPHOCYTES 1.5 0.8 - 3.5 K/UL    ABS. MONOCYTES 1.2 (H) 0.0 - 1.0 K/UL    ABS. EOSINOPHILS 0.1 0.0 - 0.4 K/UL    ABS. BASOPHILS 0.0 0.0 - 0.1 K/UL    ABS. IMM. GRANS. 0.0 0.00 - 0.04 K/UL    DF AUTOMATED     METABOLIC PANEL, COMPREHENSIVE    Collection Time: 08/08/19  2:29 PM   Result Value Ref Range    Sodium 138 136 - 145 mmol/L    Potassium 4.4 3.5 - 5.1 mmol/L    Chloride 104 97 - 108 mmol/L    CO2 30 21 - 32 mmol/L    Anion gap 4 (L) 5 - 15 mmol/L    Glucose 97 65 - 100 mg/dL    BUN 14 6 - 20 MG/DL    Creatinine 1.39 (H) 0.70 - 1.30 MG/DL    BUN/Creatinine ratio 10 (L) 12 - 20      GFR est AA >60 >60 ml/min/1.73m2    GFR est non-AA 56 (L) >60 ml/min/1.73m2    Calcium 9.1 8.5 - 10.1 MG/DL    Bilirubin, total 0.6 0.2 - 1.0 MG/DL    ALT (SGPT) 55 12 - 78 U/L    AST (SGOT) 27 15 - 37 U/L    Alk.  phosphatase 75 45 - 117 U/L    Protein, total 8.1 6.4 - 8.2 g/dL    Albumin 3.4 (L) 3.5 - 5.0 g/dL Globulin 4.7 (H) 2.0 - 4.0 g/dL    A-G Ratio 0.7 (L) 1.1 - 2.2     D DIMER    Collection Time: 08/08/19  2:29 PM   Result Value Ref Range    D-dimer 6.89 (H) 0.00 - 0.65 mg/L FEU   TROPONIN I    Collection Time: 08/08/19  2:29 PM   Result Value Ref Range    Troponin-I, Qt. <0.05 <0.05 ng/mL   CK W/ CKMB & INDEX    Collection Time: 08/08/19  2:29 PM   Result Value Ref Range    CK 88 39 - 308 U/L    CK - MB <1.0 <3.6 NG/ML    CK-MB Index Cannot be calculated 0.0 - 2.5         Radiologic Studies -   CTA CHEST W OR W WO CONT   Final Result   IMPRESSION:    1. Bilateral large pulmonary emboli. 2. Bilateral patchy atelectasis or airspace disease. The findings were called to Dr. Anisha Almaraz on 8/8/2019 at 1622 hours by myself. [id number]         XR CHEST PA LAT   Final Result   IMPRESSION: Bibasilar opacities may reflect airspace disease or atelectasis. CT Results  (Last 48 hours)               08/08/19 1611  CTA CHEST W OR W WO CONT Final result    Impression:  IMPRESSION:    1. Bilateral large pulmonary emboli. 2. Bilateral patchy atelectasis or airspace disease. The findings were called to Dr. Anisha Almaraz on 8/8/2019 at 1622 hours by myself. [id number]           Narrative:  EXAM: CT ANGIOGRAPHY CHEST    INDICATION: CP and SOB x1 week with elevated d-dimer. COMPARISON: None. CONTRAST: 100 mL of Isovue-370. TECHNIQUE:    Precontrast  images were obtained to localize the volume for acquisition. Multislice helical CT arteriography was performed from the diaphragm to the   thoracic inlet during uneventful rapid bolus intravenous contrast   administration. Lung and soft tissue windows were generated. Coronal and   sagittal  reformatted images were also generated and 3D post processing   consisting of coronal maximum intensity projection images was performed.  CT dose   reduction was achieved through use of a standardized protocol tailored for this   examination and automatic exposure control for dose modulation. FINDINGS:   LOWER NECK: The visualized portions of the thyroid and structures of the lower   neck are within normal limits. LUNGS: There are patchy areas of airspace disease or atelectasis in the lower   lung zones bilaterally. PLEURA: There is no pleural effusion or pneumothorax. PULMONARY ARTERIES: The pulmonary arteries are well enhanced and there are large   bilateral lower lobe pulmonary emboli and emboli in the main pulmonary arteries   and upper lobe pulmonary arteries as well. AORTA: The aorta enhances normally without evidence of aneurysm or dissection. MEDIASTINUM: There is no mediastinal or hilar adenopathy or mass. BONES AND SOFT TISSUES: The bones and soft tissues of the chest wall are within   normal limits. UPPER ABDOMEN: The visualized portions of the upper abdominal organs are normal.               CXR Results  (Last 48 hours)               08/08/19 1312  XR CHEST PA LAT Final result    Impression:  IMPRESSION: Bibasilar opacities may reflect airspace disease or atelectasis. Narrative:  INDICATION:  sob        COMPARISON: July 31       FINDINGS: PA and lateral views of the chest demonstrate a stable   cardiomediastinal silhouette. There are bibasilar opacities. The visualized   osseous structures are unremarkable. Medical Decision Making   I am the first provider for this patient. I reviewed the vital signs, available nursing notes, past medical history, past surgical history, family history and social history. Vital Signs-Reviewed the patient's vital signs. Patient Vitals for the past 12 hrs:   Temp Pulse Resp BP SpO2   08/08/19 1700 -- (!) 102 27 146/68 96 %   08/08/19 1221 98.7 °F (37.1 °C) 100 16 168/76 98 %           EKG interpretation: (Preliminary)  Rhythm: normal sinus rhythm; and regular . Records Reviewed: Old Medical Records  Prior visit    Provider Notes (Medical Decision Making):   Possible PE vs bronchitis. Will r/out PE with ddimer and helical CT. R/out PNA and pneumothorax with CXR. Low suspicion of lung ca secondary to HPI and PE.     ED Course:   Initial assessment performed. The patients presenting problems have been discussed, and they are in agreement with the care plan formulated and outlined with them. I have encouraged them to ask questions as they arise throughout their visit. 4:47 PM - I took over for Trisha Dubose at the end of her shift. CT showing bilateral large PE. Discussed with patient and Dr. Song Falk. Will order heparin and consult hospitalist.    CONSULT NOTE:   Mark Chang PA-C spoke with Dr. Sapphire Nelson,   Specialty: hospitalist  Discussed pt's hx, disposition, and available diagnostic and imaging results. Reviewed care plans. Consultant agrees with plans as outlined. CRITICAL CARE NOTE :    4:47 PM      IMPENDING DETERIORATION -Cardiovascular    ASSOCIATED RISK FACTORS - pulmonary emboli    MANAGEMENT- Bedside Assessment and Supervision of Care    INTERPRETATION -  Xrays and ECG    INTERVENTIONS - hemodynamic mngmt and heparin    CASE REVIEW - Hospitalist    TREATMENT RESPONSE -Stable    PERFORMED BY - JAHAIRA Alan, Trisha Dubose, Fredy Garcia MD        NOTES   :      I have spent 30-60 minutes of critical care time involved in lab review, consultations with specialist, family decision- making, bedside attention and documentation. During this entire length of time I was immediately available to the patient . JAHAIRA Stevens and Trisha Dubose          Disposition:  ADMIT NOTE:  4:47 PM  The patient is being admitted to the hospital by hospitalist.  The results of their tests and reasons for their admission have been discussed with the patient and/or available family. They convey agreement and understanding for the need to be admitted and for their admission diagnosis. Diagnosis     Clinical Impression:   1.  Bilateral pulmonary embolism ([address]

## 2019-08-08 NOTE — ED NOTES
Bedside shift change report given to DAYA Reis (oncoming nurse) by Cristhian Dietrich RN (offgoing nurse). Report included the following information SBAR, Kardex and Recent Results.

## 2019-08-08 NOTE — ED NOTES
Patient states he went to workout yesterday s/p a pulled chest muscle last week.  Patient reports he hasnt been able to sleep since Sun.

## 2019-08-08 NOTE — H&P
Hospitalist Admission Note    NAME: Eboni Das   :  1975   MRN:  [id number]     Date/Time:  2019 6:24 PM    Patient PCP: Marilyn García MD  ________________________________________________________________________    My assessment of this patient's clinical condition and my plan of care is as follows. Assessment / Plan:  Acute bilateral pulmonary embolism likely unprovoked  -Patient is currently hemodynamically stable  -Only risk factors are smoking and using testosterone supplement but not recently  -Continue heparin drip as ordered by ED  -Check troponins x2 and BNP for risk stratification.  Echocardiography echocardiogram to rule out right heart strain.   -Given his age, hypercoagulable disorders are likely so we will consult oncology  -He is currently on room air    Hypertension  -Currently blood pressure is 146/68. [address] for medication reconciliation    Morbid obesity  -Counseled regarding smoking cessation    Possible CKD stage II  -Baseline creatinine appears to be around 1.3 and currently creatinine is also 1.3  -Check BMP in a.m.  Avoid nephrotoxic medications  -Received 1 dose of Toradol in the ED and will avoid further doses    Chronic smoking  -11 min spent counseling regarding smoking cessation                      Code Status: full      DVT Prophylaxis: Heparin drip  GI Prophylaxis: not indicated    Baseline: From home independent        Subjective:   CHIEF COMPLAINT: Chest pain and sob    HISTORY OF PRESENT ILLNESS:     This is a 82-year-old male with past medical history of hypertension, obesity is coming to the hospital with chief complaints of chest pain and also shortness of breath since the last 1 week.  Patient reports he was in his usual state of health until about a week ago when he started having sharp chest pain with progressive shortness of breath with sickle activity.  Does not report any cough or phlegm.  Denies fever or chills.  He reports using testosterone supplement about 6 months ago. Anant Mercado not report any long-distance travel other than driving to [facility] about an hour ago. Anant Meracdo not report any family history of blood clots.  Does not report any similar episodes in the past.  Denies abdominal pain, nausea or vomiting. On arrival to the hospital his vital signs were within normal limits except for mild tachycardia.  On lab work he was noted to have a white count of 11.2.  Creatinine was 1.39 on lab work. Oliva Wilkinson had a CTA of the chest which showed evidence of bilateral large pulmonary embolism with bilateral patchy atelectasis or airspace disease.  He was started on heparin drip. We were asked to admit for work up and evaluation of the above problems. Past Medical History:   Diagnosis Date    Hypertension     Rotator cuff injury         Past Surgical History:   Procedure Laterality Date    HX BACK SURGERY  1999    HX ORTHOPAEDIC      finger     HX SHOULDER ARTHROSCOPY         Social History     Tobacco Use    Smoking status: Former Smoker     Packs/day: 0.50     Years: 0.00     Pack years: 0.00    Smokeless tobacco: Never Used   Substance Use Topics    Alcohol use: Yes     Comment: occasionally        Family History   Problem Relation Age of Onset    Asthma Mother     Hypertension Mother     Hypertension Father     Hypertension Paternal Uncle     Diabetes Paternal Grandmother      Allergies   Allergen Reactions    Morphine Nausea and Vomiting        Prior to Admission medications    Medication Sig Start Date End Date Taking? Authorizing Provider   multivitamin (ONE A DAY) tablet Take 1 Tab by mouth daily. Yes Provider, Historical   docosahexanoic acid/epa (FISH OIL PO) Take 1 Tab by mouth daily. Yes Provider, Historical   COLLAGEN Take 1 Tab by mouth daily. Yes Provider, Historical   cholecalciferol, VITAMIN D3, (VITAMIN D3) 5,000 unit tab tablet Take 1 Tab by mouth daily.  5/24/19  Yes Mat Cm MD       REVIEW OF SYSTEMS:     I am not able to complete the review of systems because: The patient is intubated and sedated    The patient has altered mental status due to his acute medical problems    The patient has baseline aphasia from prior stroke(s)    The patient has baseline dementia and is not reliable historian    The patient is in acute medical distress and unable to provide information           Total of 12 systems reviewed as follows:       POSITIVE= underlined text  Negative = text not underlined  General:  fever, chills, sweats, generalized weakness, weight loss/gain,      loss of appetite   Eyes:    blurred vision, eye pain, loss of vision, double vision  ENT:    rhinorrhea, pharyngitis   Respiratory:   cough, sputum production, SOB, BORDEN, wheezing, pleuritic pain   Cardiology:   chest pain, palpitations, orthopnea, PND, edema, syncope   Gastrointestinal:  abdominal pain , N/V, diarrhea, dysphagia, constipation, bleeding   Genitourinary:  frequency, urgency, dysuria, hematuria, incontinence   Muskuloskeletal :  arthralgia, myalgia, back pain  Hematology:  easy bruising, nose or gum bleeding, lymphadenopathy   Dermatological: rash, ulceration, pruritis, color change / jaundice  Endocrine:   hot flashes or polydipsia   Neurological:  headache, dizziness, confusion, focal weakness, paresthesia,     Speech difficulties, memory loss, gait difficulty  Psychological: Feelings of anxiety, depression, agitation    Objective:   VITALS:    Visit Vitals  /68   Pulse (!) 102   Temp 98.7 °F (37.1 °C)   Resp 27   Ht 5' 7\" (1.702 m)   Wt 120.7 kg (266 lb 1.5 oz)   SpO2 96%   BMI 41.68 kg/m²       PHYSICAL EXAM:    General:    Alert, cooperative, no distress, appears stated age. HEENT: Atraumatic, anicteric sclerae, pink conjunctivae     No oral ulcers, mucosa moist  Neck:  Supple, symmetrical,  thyroid: non tender  Lungs:   Clear to auscultation bilaterally. No Wheezing or Rhonchi. No rales.   Chest wall:  No tenderness No Accessory muscle use. Heart:   Regular  rhythm,  No  murmur   No edema  Abdomen:   Soft, non-tender. Not distended. Bowel sounds normal  Extremities: No cyanosis. No clubbing,      Skin turgor normal, Capillary refill normal, Radial dial pulse 2+  Skin:     Not pale. Not Jaundiced  No rashes   Psych:  Not anxious or agitated. Neurologic: EOMs intact. No facial asymmetry. No aphasia or slurred speech. Symmetrical strength, Sensation grossly intact. Alert and oriented X 4.     _______________________________________________________________________  Care Plan discussed with:    Comments   Patient y    Family      RN y    Care Manager                    Consultant:      _______________________________________________________________________  Expected  Disposition:   Home with Family y   HH/PT/OT/RN    SNF/LTC    LEONARD    ________________________________________________________________________  TOTAL TIME:  61  Minutes    Critical Care Provided     Minutes non procedure based      Comments    y Reviewed previous records   >50% of visit spent in counseling and coordination of care y Discussion with patient and/or family and questions answered       ________________________________________________________________________  Signed: Edda Lorenzana MD    Procedures: see electronic medical records for all procedures/Xrays and details which were not copied into this note but were reviewed prior to creation of Plan.     LAB DATA REVIEWED:    Recent Results (from the past 24 hour(s))   EKG, 12 LEAD, INITIAL    Collection Time: 08/08/19 12:26 PM   Result Value Ref Range    Ventricular Rate 97 BPM    Atrial Rate 97 BPM    P-R Interval 130 ms    QRS Duration 82 ms    Q-T Interval 306 ms    QTC Calculation (Bezet) 388 ms    Calculated P Axis 40 degrees    Calculated R Axis 36 degrees    Calculated T Axis 32 degrees    Diagnosis       Normal sinus rhythm  Normal ECG  When compared with ECG of 31-JUL-2019 20:01,  Minimal criteria for Inferior infarct are no longer present  T wave amplitude has decreased in Lateral leads     CBC WITH AUTOMATED DIFF    Collection Time: 08/08/19  2:29 PM   Result Value Ref Range    WBC 11.2 (H) 4.1 - 11.1 K/uL    RBC 4.46 4.10 - 5.70 M/uL    HGB 13.1 12.1 - 17.0 g/dL    HCT 38.1 36.6 - 50.3 %    MCV 85.4 80.0 - 99.0 FL    MCH 29.4 26.0 - 34.0 PG    MCHC 34.4 30.0 - 36.5 g/dL    RDW 12.2 11.5 - 14.5 %    PLATELET 467 172 - 647 K/uL    MPV 9.5 8.9 - 12.9 FL    NRBC 0.0 0  WBC    ABSOLUTE NRBC 0.00 0.00 - 0.01 K/uL    NEUTROPHILS 75 32 - 75 %    LYMPHOCYTES 13 12 - 49 %    MONOCYTES 11 5 - 13 %    EOSINOPHILS 1 0 - 7 %    BASOPHILS 0 0 - 1 %    IMMATURE GRANULOCYTES 0 0.0 - 0.5 %    ABS. NEUTROPHILS 8.4 (H) 1.8 - 8.0 K/UL    ABS. LYMPHOCYTES 1.5 0.8 - 3.5 K/UL    ABS. MONOCYTES 1.2 (H) 0.0 - 1.0 K/UL    ABS. EOSINOPHILS 0.1 0.0 - 0.4 K/UL    ABS. BASOPHILS 0.0 0.0 - 0.1 K/UL    ABS. IMM. GRANS. 0.0 0.00 - 0.04 K/UL    DF AUTOMATED     METABOLIC PANEL, COMPREHENSIVE    Collection Time: 08/08/19  2:29 PM   Result Value Ref Range    Sodium 138 136 - 145 mmol/L    Potassium 4.4 3.5 - 5.1 mmol/L    Chloride 104 97 - 108 mmol/L    CO2 30 21 - 32 mmol/L    Anion gap 4 (L) 5 - 15 mmol/L    Glucose 97 65 - 100 mg/dL    BUN 14 6 - 20 MG/DL    Creatinine 1.39 (H) 0.70 - 1.30 MG/DL    BUN/Creatinine ratio 10 (L) 12 - 20      GFR est AA >60 >60 ml/min/1.73m2    GFR est non-AA 56 (L) >60 ml/min/1.73m2    Calcium 9.1 8.5 - 10.1 MG/DL    Bilirubin, total 0.6 0.2 - 1.0 MG/DL    ALT (SGPT) 55 12 - 78 U/L    AST (SGOT) 27 15 - 37 U/L    Alk.  phosphatase 75 45 - 117 U/L    Protein, total 8.1 6.4 - 8.2 g/dL    Albumin 3.4 (L) 3.5 - 5.0 g/dL    Globulin 4.7 (H) 2.0 - 4.0 g/dL    A-G Ratio 0.7 (L) 1.1 - 2.2     D DIMER    Collection Time: 08/08/19  2:29 PM   Result Value Ref Range    D-dimer 6.89 (H) 0.00 - 0.65 mg/L FEU   TROPONIN I    Collection Time: 08/08/19  2:29 PM   Result Value Ref Range    Troponin-I, Qt. <0.05 <0.05 ng/mL   CK W/ CKMB & INDEX    Collection Time: 08/08/19  2:29 PM   Result Value Ref Range    CK 88 39 - 308 U/L    CK - MB <1.0 <3.6 NG/ML    CK-MB Index Cannot be calculated 0.0 - 2.5     PTT    Collection Time: 08/08/19  5:16 PM   Result Value Ref Range    aPTT 28.2 22.1 - 32.0 sec    aPTT, therapeutic range     58.0 - 77.0 SECS

## 2019-08-08 NOTE — PROGRESS NOTES
Pharmacy Clarification of the Prior to Admission Medication Regimen Retrospective to the Admission Medication Reconciliation    The patient was interviewed regarding clarification of the prior to admission medication regimen and was questioned regarding use of any other inhalers, topical products, over the counter medications, herbal medications, vitamin products or ophthalmic/nasal/otic medication use. Information Obtained From: RX Query, Patient    Recommendations/Findings: The following amendments were made to the patient's active medication list on file at [facility]:     1) Additions:   COLLAGEN  docosahexanoic acid/epa (FISH OIL PO)  multivitamin (ONE A DAY) tablet    2) Removals: None    3) Changes: None    4) Pertinent Pharmacy Findings: None     PTA medication list was corrected to the following:     Prior to Admission Medications   Prescriptions Last Dose Informant Patient Reported? Taking? COLLAGEN 8/7/2019 at Unknown time Self Yes Yes   Sig: Take 1 Tab by mouth daily. cholecalciferol, VITAMIN D3, (VITAMIN D3) 5,000 unit tab tablet 8/8/2019 at Unknown time Self No Yes   Sig: Take 1 Tab by mouth daily. docosahexanoic acid/epa (FISH OIL PO) 8/7/2019 at Unknown time Self Yes Yes   Sig: Take 1 Tab by mouth daily. multivitamin (ONE A DAY) tablet 8/8/2019 at Unknown time Self Yes Yes   Sig: Take 1 Tab by mouth daily.       Facility-Administered Medications: None          Thank you,  Lula Blanc  Medication History Pharmacy Technician

## 2019-08-09 ENCOUNTER — APPOINTMENT (OUTPATIENT)
Dept: NON INVASIVE DIAGNOSTICS | Age: 44
DRG: 134 | End: 2019-08-09
Attending: INTERNAL MEDICINE
Payer: MEDICAID

## 2019-08-09 LAB
ANION GAP SERPL CALC-SCNC: 8 MMOL/L (ref 5–15)
APTT PPP: 35.6 SEC (ref 22.1–32)
APTT PPP: 43 SEC (ref 22.1–32)
APTT PPP: 44.7 SEC (ref 22.1–32)
APTT PPP: 60.5 SEC (ref 22.1–32)
APTT PPP: 74 SEC (ref 22.1–32)
APTT PPP: >130 SEC (ref 22.1–32)
BASOPHILS # BLD: 0 K/UL (ref 0–0.1)
BASOPHILS NFR BLD: 0 % (ref 0–1)
BNP SERPL-MCNC: 60 PG/ML
BUN SERPL-MCNC: 19 MG/DL (ref 6–20)
BUN/CREAT SERPL: 15 (ref 12–20)
CALCIUM SERPL-MCNC: 9.2 MG/DL (ref 8.5–10.1)
CHLORIDE SERPL-SCNC: 109 MMOL/L (ref 97–108)
CO2 SERPL-SCNC: 24 MMOL/L (ref 21–32)
CREAT SERPL-MCNC: 1.27 MG/DL (ref 0.7–1.3)
DIFFERENTIAL METHOD BLD: ABNORMAL
ECHO AO ROOT DIAM: 3.27 CM
ECHO AV AREA PEAK VELOCITY: 1.6 CM2
ECHO AV AREA/BSA PEAK VELOCITY: 0.7 CM2/M2
ECHO AV PEAK GRADIENT: 7.9 MMHG
ECHO AV PEAK VELOCITY: 140.61 CM/S
ECHO EST RA PRESSURE: 10 MMHG
ECHO LA VOL 4C: 23.1 ML (ref 18–58)
ECHO LA VOLUME INDEX A4C: 10.12 ML/M2 (ref 16–28)
ECHO LV INTERNAL DIMENSION DIASTOLIC: 4.14 CM (ref 4.2–5.9)
ECHO LV INTERNAL DIMENSION SYSTOLIC: 3.23 CM
ECHO LV IVSD: 1.91 CM (ref 0.6–1)
ECHO LV MASS 2D: 285.6 G (ref 88–224)
ECHO LV MASS INDEX 2D: 125.1 G/M2 (ref 49–115)
ECHO LV POSTERIOR WALL DIASTOLIC: 1.05 CM (ref 0.6–1)
ECHO LV POSTERIOR WALL SYSTOLIC: 0.96 CM
ECHO LVOT DIAM: 1.47 CM
ECHO LVOT PEAK GRADIENT: 7.1 MMHG
ECHO LVOT PEAK VELOCITY: 133.63 CM/S
ECHO LVOT SV: 44.8 ML
ECHO LVOT VTI: 26.59 CM
ECHO MV A VELOCITY: 62.48 CM/S
ECHO MV AREA PHT: 2.7 CM2
ECHO MV AREA VTI: 1.5 CM2
ECHO MV E DECELERATION TIME (DT): 277.9 MS
ECHO MV E VELOCITY: 85.38 CM/S
ECHO MV E/A RATIO: 1.37
ECHO MV MAX VELOCITY: 105.64 CM/S
ECHO MV MEAN GRADIENT: 1.7 MMHG
ECHO MV PEAK GRADIENT: 4.5 MMHG
ECHO MV PRESSURE HALF TIME (PHT): 80.6 MS
ECHO MV REGURGITANT PEAK GRADIENT: 5.6 MMHG
ECHO MV REGURGITANT PEAK VELOCITY: 118.08 CM/S
ECHO MV VTI: 29.01 CM
ECHO PULMONARY ARTERY SYSTOLIC PRESSURE (PASP): 34.4 MMHG
ECHO RIGHT VENTRICULAR SYSTOLIC PRESSURE (RVSP): 34.4 MMHG
ECHO TV REGURGITANT MAX VELOCITY: 246.89 CM/S
ECHO TV REGURGITANT PEAK GRADIENT: 24.4 MMHG
EOSINOPHIL # BLD: 0 K/UL (ref 0–0.4)
EOSINOPHIL NFR BLD: 0 % (ref 0–7)
ERYTHROCYTE [DISTWIDTH] IN BLOOD BY AUTOMATED COUNT: 12.1 % (ref 11.5–14.5)
FACT VIII ACT/NOR PPP: 295 % (ref 80–200)
GLUCOSE SERPL-MCNC: 137 MG/DL (ref 65–100)
HCT VFR BLD AUTO: 35.3 % (ref 36.6–50.3)
HGB BLD-MCNC: 12.4 G/DL (ref 12.1–17)
IMM GRANULOCYTES # BLD AUTO: 0 K/UL (ref 0–0.04)
IMM GRANULOCYTES NFR BLD AUTO: 0 % (ref 0–0.5)
LYMPHOCYTES # BLD: 0.7 K/UL (ref 0.8–3.5)
LYMPHOCYTES NFR BLD: 5 % (ref 12–49)
MCH RBC QN AUTO: 29.5 PG (ref 26–34)
MCHC RBC AUTO-ENTMCNC: 35.1 G/DL (ref 30–36.5)
MCV RBC AUTO: 84 FL (ref 80–99)
MONOCYTES # BLD: 0.7 K/UL (ref 0–1)
MONOCYTES NFR BLD: 5 % (ref 5–13)
NEUTS SEG # BLD: 12.5 K/UL (ref 1.8–8)
NEUTS SEG NFR BLD: 90 % (ref 32–75)
NRBC # BLD: 0 K/UL (ref 0–0.01)
NRBC BLD-RTO: 0 PER 100 WBC
PERIPHERAL SMEAR,PSM: NORMAL
PLATELET # BLD AUTO: 216 K/UL (ref 150–400)
PMV BLD AUTO: 9.7 FL (ref 8.9–12.9)
POTASSIUM SERPL-SCNC: 4.2 MMOL/L (ref 3.5–5.1)
RBC # BLD AUTO: 4.2 M/UL (ref 4.1–5.7)
RBC MORPH BLD: ABNORMAL
SODIUM SERPL-SCNC: 141 MMOL/L (ref 136–145)
THERAPEUTIC RANGE,PTTT: ABNORMAL SECS (ref 58–77)
TROPONIN I SERPL-MCNC: <0.05 NG/ML
TROPONIN I SERPL-MCNC: <0.05 NG/ML
WBC # BLD AUTO: 13.9 K/UL (ref 4.1–11.1)

## 2019-08-09 PROCEDURE — 85300 ANTITHROMBIN III ACTIVITY: CPT

## 2019-08-09 PROCEDURE — 85730 THROMBOPLASTIN TIME PARTIAL: CPT

## 2019-08-09 PROCEDURE — 36415 COLL VENOUS BLD VENIPUNCTURE: CPT

## 2019-08-09 PROCEDURE — 93306 TTE W/DOPPLER COMPLETE: CPT

## 2019-08-09 PROCEDURE — 80048 BASIC METABOLIC PNL TOTAL CA: CPT

## 2019-08-09 PROCEDURE — 86146 BETA-2 GLYCOPROTEIN ANTIBODY: CPT

## 2019-08-09 PROCEDURE — 74011250636 HC RX REV CODE- 250/636: Performed by: PHYSICIAN ASSISTANT

## 2019-08-09 PROCEDURE — 85240 CLOT FACTOR VIII AHG 1 STAGE: CPT

## 2019-08-09 PROCEDURE — 85025 COMPLETE CBC W/AUTO DIFF WBC: CPT

## 2019-08-09 PROCEDURE — 74011250637 HC RX REV CODE- 250/637: Performed by: INTERNAL MEDICINE

## 2019-08-09 PROCEDURE — 83880 ASSAY OF NATRIURETIC PEPTIDE: CPT

## 2019-08-09 PROCEDURE — 81241 F5 GENE: CPT

## 2019-08-09 PROCEDURE — 65660000000 HC RM CCU STEPDOWN

## 2019-08-09 PROCEDURE — 85306 CLOT INHIBIT PROT S FREE: CPT

## 2019-08-09 PROCEDURE — 85613 RUSSELL VIPER VENOM DILUTED: CPT

## 2019-08-09 PROCEDURE — 84484 ASSAY OF TROPONIN QUANT: CPT

## 2019-08-09 PROCEDURE — 86147 CARDIOLIPIN ANTIBODY EA IG: CPT

## 2019-08-09 PROCEDURE — 85303 CLOT INHIBIT PROT C ACTIVITY: CPT

## 2019-08-09 PROCEDURE — 74011250636 HC RX REV CODE- 250/636: Performed by: EMERGENCY MEDICINE

## 2019-08-09 PROCEDURE — 81240 F2 GENE: CPT

## 2019-08-09 PROCEDURE — 85305 CLOT INHIBIT PROT S TOTAL: CPT

## 2019-08-09 PROCEDURE — 85302 CLOT INHIBIT PROT C ANTIGEN: CPT

## 2019-08-09 RX ADMIN — Medication 10 ML: at 06:03

## 2019-08-09 RX ADMIN — VITAMIN D, TAB 1000IU (100/BT) 5000 UNITS: 25 TAB at 08:51

## 2019-08-09 RX ADMIN — Medication 10 ML: at 14:25

## 2019-08-09 RX ADMIN — HEPARIN SODIUM 9650 UNITS: 5000 INJECTION INTRAVENOUS; SUBCUTANEOUS at 01:05

## 2019-08-09 RX ADMIN — Medication 20 ML: at 14:24

## 2019-08-09 RX ADMIN — HEPARIN SODIUM AND DEXTROSE 18 UNITS/KG/HR: 10000; 5 INJECTION INTRAVENOUS at 08:57

## 2019-08-09 RX ADMIN — ACETAMINOPHEN 650 MG: 325 TABLET ORAL at 13:26

## 2019-08-09 RX ADMIN — HEPARIN SODIUM AND DEXTROSE 18 UNITS/KG/HR: 10000; 5 INJECTION INTRAVENOUS at 19:57

## 2019-08-09 RX ADMIN — HEPARIN SODIUM 4850 UNITS: 5000 INJECTION INTRAVENOUS; SUBCUTANEOUS at 08:45

## 2019-08-09 NOTE — PROGRESS NOTES
Bedside shift change report given to Malick Walker (oncoming nurse) by Ceci Maddox (offgoing nurse). Report included the following information SBAR, Kardex, Intake/Output, MAR, Recent Results and Cardiac Rhythm NSR.

## 2019-08-09 NOTE — PROGRESS NOTES
Problem: Falls - Risk of  Goal: *Absence of Falls  Description  Document Amanda Kearney Fall Risk and appropriate interventions in the flowsheet.   Outcome: Progressing Towards Goal  Note:   Fall Risk Interventions:                                Problem: Patient Education: Go to Patient Education Activity  Goal: Patient/Family Education  Outcome: Progressing Towards Goal     Problem: Pulmonary Embolism Care Plan (Adult)  Goal: *Improvement of existing pulmonary embolism  Outcome: Progressing Towards Goal  Goal: *Absence of bleeding  Outcome: Progressing Towards Goal  Goal: *Labs within defined limits  Outcome: Progressing Towards Goal     Problem: Patient Education: Go to Patient Education Activity  Goal: Patient/Family Education  Outcome: Progressing Towards Goal

## 2019-08-09 NOTE — CONSULTS
Note dictated  Please call over the weekend with any questions. He can f/u with me in office if discharged over the weekend.  If patient here Monday, will have service f/u

## 2019-08-09 NOTE — PROGRESS NOTES
PCU SHIFT NURSING NOTE      Bedside and Verbal shift change report given to Marquis Chakraborty RN/Urvashi Doan RN (oncoming nurse) by Abby Washington RN (offgoing nurse). Report included the following information SBAR, Kardex, ED Summary, Procedure Summary, Intake/Output, MAR, Recent Results, Med Rec Status, Cardiac Rhythm NSR, Alarm Parameters  and Quality Measures. Shift Summary:   0700--Bedside report received from Abby Washington RN. Patient is resting in bed with family in room. Call bell is with reach. Will get the rest of reports and come back to patients room. Will monitor. [id number] Randal Carey, RN is being precepted by Marquis Chakraborty RN. Preceptor will check over all documentation entered by Maico Davies throughout the day and at the end of the shift.

## 2019-08-09 NOTE — PROGRESS NOTES
TRANSFER - IN REPORT:    Verbal report received from [facility] [address] RN(name) on [employer]  being received from PCU(unit) for routine progression of care      Report consisted of patients Situation, Background, Assessment and   Recommendations(SBAR). Information from the following report(s) SBAR, Kardex, Intake/Output, MAR, Recent Results, Med Rec Status and Cardiac Rhythm NSR was reviewed with the receiving nurse. Opportunity for questions and clarification was provided. Assessment completed upon patients arrival to unit and care assumed. 0000. Patient had a 35.6 aPTT. Changed Heparin drip 16u/kg/hr. 10,000u bolus given. 5435. On AM labs aPPT was drawn too early. No change in heparin gtt.     0645. Patient had an uneventful night. No questions or concerns at this time. Bedside and Verbal shift change report given to Shahid Deleon RN/Urvashi RN (oncoming nurse) by Jyoti Simpson RN (offgoing nurse). Report included the following information SBAR, Kardex, Intake/Output, MAR, Recent Results, Med Rec Status and Cardiac Rhythm NSR.

## 2019-08-09 NOTE — PROGRESS NOTES
Hospitalist Progress Note           2019  3:00 PM                       Patient:  Radha Grossman  PCP:  Buddy Carrillo MD  Date of admission:  2019    42 yo male with PMHx of HTN, Obesity, admitted for sob and pleuritic chest pain. Pt was seen in ER ~ 1 week ago for similar symptoms. Assessment & Plan  Acute B/L PE  - pt c/o b/l LE Pain ~1  Month ago   - on heparin gtt now   - hypercoagulable work up   - outpatient heme follow up   - Trying to d/c on Eliquis/Xarelto but not sure if he can afford it. If not able to then will need Lovenox and Coumadin at d/c and outpatient follow up for INR checks, Hematology or PCP can monitor INR. Hx of HTN  - no meds on PTA  - will ask patient to bring bottles     Morbid Obesity  Body mass index is 41.68 kg/m². Patient is active and states exercises with daughter as she plays softball  Lifestyle modification required      VTE prophylaxis: on heparin gtt  Follow-up labs/studies: Hypercoagulable work up   Discussed plan of care with Patient/Family, Nurse and    Disposition:  Anticipate discharge to HOME in AM       Subjective  Pt seen and examined  States that sob is much better.    No leg pain      Review of systems otherwise as above     Physical examination  Visit Vitals  /53 (BP 1 Location: Left arm, BP Patient Position: At rest)   Pulse 73   Temp 98.1 °F (36.7 °C)   Resp 18   Ht 5' 7\" (1.702 m)   Wt 120.7 kg (266 lb 1.5 oz)   SpO2 98%   BMI 41.68 kg/m²      Temp (24hrs), Av.2 °F (36.8 °C), Min:97.5 °F (36.4 °C), Max:98.7 °F (37.1 °C)         O2 Device: Room air  Visit Vitals  /53 (BP 1 Location: Left arm, BP Patient Position: At rest)   Pulse 73   Temp 98.1 °F (36.7 °C)   Resp 18   Ht 5' 7\" (1.702 m)   Wt 120.7 kg (266 lb 1.5 oz)   SpO2 98%   BMI 41.68 kg/m²    O2 Device: Room air  Visit Vitals  /53 (BP 1 Location: Left arm, BP Patient Position: At rest)   Pulse 73   Temp 98.1 °F (36.7 °C)   Resp 18   Ht 5' 7\" (1.702 m)   Wt 120.7 kg (266 lb 1.5 oz)   SpO2 98%   BMI 41.68 kg/m²         Intake/Output Summary (Last 24 hours) at 8/9/2019 1500  Last data filed at 8/9/2019 1300  Gross per 24 hour   Intake 345.21 ml   Output --   Net 345.21 ml     Last shift:    08/09 0701 - 08/09 1900  In: 122.3 [I.V.:122.3]  Out: -   Last 3 shifts:    08/07 1901 - 08/09 0700  In: 222.9 [I.V.:222.9]  Out: -     General:   Alert, cooperative, no acute distress   Head:   No obvious abnormalities, atraumatic   Eyes:   Conjunctivae clear   Oropharynx:  Oral mucosa normal   Neck:  Supple, trachea midline, no adenopathy   No JVD   Chest wall:    No tenderness or deformities    Lungs:   Clear to auscultation bilaterally    Heart:   Regular rhythm, no murmur   Abdomen:    Soft, non-tender    Bowel sounds normal    No masses or organomegaly    Extremities:  No edema or DVT signs   Pulses:  Symmetric all extremities   Skin:  Warm and dry    No rashes or lesions   Neurologic:  Oriented x3   No focal deficits   Urinary catheter:  N/A     Data review  Reviewed CTA, meds    Recent Labs     08/09/19  0000 08/08/19  1429   WBC 13.9* 11.2*   HGB 12.4 13.1   HCT 35.3* 38.1    225     Recent Labs     08/09/19  0409 08/08/19  1429    138   K 4.2 4.4   * 104   CO2 24 30   * 97   BUN 19 14   CREA 1.27 1.39*   CA 9.2 9.1   ALB  --  3.4*   TBILI  --  0.6   SGOT  --  27   ALT  --  55     Current Facility-Administered Medications   Medication Dose Route Frequency    sodium chloride (NS) flush 5-40 mL  5-40 mL IntraVENous Q8H    sodium chloride (NS) flush 5-40 mL  5-40 mL IntraVENous PRN    heparin 25,000 units in D5W 250 ml infusion  12-36 Units/kg/hr IntraVENous TITRATE    heparin (porcine) injection 4,850 Units  40 Units/kg IntraVENous PRN    Or    heparin (porcine) injection 9,650 Units  80 Units/kg IntraVENous PRN    cholecalciferol (VITAMIN D3) tablet 5,000 Units  5,000 Units Oral DAILY    sodium chloride (NS) flush 5-40 mL  5-40 mL IntraVENous Q8H    sodium chloride (NS) flush 5-40 mL  5-40 mL IntraVENous PRN    acetaminophen (TYLENOL) tablet 650 mg  650 mg Oral Q6H PRN    ondansetron (ZOFRAN) injection 4 mg  4 mg IntraVENous Q6H PRN    docusate sodium (COLACE) capsule 100 mg  100 mg Oral DAILY PRN     Total time spent managing care of the patient: 30 minutes.        Vibha Garsia MD   --Hospitalist, Internal Medicine

## 2019-08-09 NOTE — PROGRESS NOTES
0725- Bedside and Verbal shift change report given to CLARE Omalley RN (oncoming nurse) by MELISSA Joseph RN (offgoing nurse). Report included the following information SBAR, Kardex, Intake/Output, MAR and Recent Results. Pt resting in bed comfortably. Pt girlfriend in room with him. No complaints of pain, will continue to monitor. 0800- aPPT results came back. [address] to verify bolus and infusion rate of Heparin. 6418- Dr. In room to talk to pt about plan/care. 0900- Bolus dose of Heparin given and rate increased to 18units. Blood samples obtained and sent to lab. Pt resting comfortably in bed. 1040- Received aPTT value from lab. Pt received bolus of heparin at 0845 and labs drawn at [address], talked with [facility] in regards to the elevated aPTT and will disregard the >130 value and continue the Heparin drip as noted. Will draw a repeat at 1300 according to protocol. 1334- aPTT redraw sent to lab. Pt family members in room. Pt complaining of pain, medication given--see MAR  1530- Pt aPTT in therapeutic range. Confirmed with Pharmacist, [facility], will continue rate as is.    1- SBAR given to Diana Schroeder RN

## 2019-08-09 NOTE — PROGRESS NOTES
Reason for Admission: Acute pulmonary embolism ([facility] [address]                   RRAT Score:  5 - low risk                     Plan for utilizing home health:  TBD                    Current Advanced Directive/Advance Care Plan: Pt is a full code status with no advanced care plan on file. Pt identified his mother as his medical decision maker if he is ever unable to make his own medical decisions. Transition of Care Plan:                      1) home with f/u appointments    CM met with pt and family at bedside to conduct initial assessment. Pt was alert and oriented x4. Pt was able to successfully verify demographic information. Pt lives with his mother, sister, and two nephews in a three level home with nine stairs leading to the entrance. Pt actively drives and stated that he has no barriers associated with transportation. Pt is independent with ADL's and doesn't utilize any form of DME in the home. Pt is currently unemployed and doesn't have insurance. Pt stated that he has a care card that was established during a prior admission in April of 2019. CM asked pt if he would be interested in being screened for Medicaid; Pt stated he was interested. CM will send referral for pt to be screened for Medicaid. Pt also stated that he has barriers accessing his medication and struggles to pay for it. Pt stated this has been an issue in the past, making it hard for him to maintain any type of medication regimen. Pt stated that he uses the [facility] on [address]. CM is going to consult with pt's pharmacy to check prices on Eliquis and Xarelto. 3:41 pm: RADHA called the [facility] to check on the price of pt's recommended medication; CM was informed that Eliquis would cost $509.26 and Sarkar Milan would cost $1,022. 17. RADHA provided pt with the financial resource \"[employer]\" in effort to screen the pt to see if he qualifies for free/discounted prescriptions.  RADHA sent an e-mail to [facility] Laureano with [employer] to have the pt screened for Medicaid. CM will continue to follow and assess pt's d/c needs. Care Management Interventions  PCP Verified by CM: Yes  Last Visit to PCP: 05/01/19  Mode of Transport at Discharge:  Other (see comment)(pt's girlfrienalex Olivo Manner: [phone]))  Transition of Care Consult (CM Consult): Discharge Planning  Physical Therapy Consult: No  Occupational Therapy Consult: No  Speech Therapy Consult: No  Current Support Network: Relative's Home(pt lives with his family)  Confirm Follow Up Transport: Self  Plan discussed with Pt/Family/Caregiver: Yes  Discharge Location  Discharge Placement: [address], [place]

## 2019-08-09 NOTE — PROGRESS NOTES
Pharmacy - Heparin Monitoring    Labs:  Recent Labs     08/09/19  0902 08/09/19  0712 08/09/19  0409 08/09/19  0000   APTT >130.0* 43.0* 44.7* 35.6*   HGB  --   --   --  12.4   PLT  --   --   --  216     Current rate:  18 unit/kg/hr    Impression/Plan:   New rate: 18 unit/kg/hr  Patient received the bolus at 0845 and the aPTT was drawn at 09:02 AM (17 minutes post bolus). Will disregard the aPTT >130 since it was drawn 17 minutes post bolus. Will continue with the current infusion rate and will draw aPTT at 1 PM and adjust the rate based on the procotol  Infusion rate, PRN bolus dose and aPTT results were discussed with the nurse: (Yes/No): Yes; Mauri Lockwood,  Jin Singleton, PHARMD    [email or web address]/Pharmacy/Clinical%20Companion/Heparin%20Protocol. pdf

## 2019-08-09 NOTE — PROGRESS NOTES
Spiritual Care Partner Volunteer visited patient in PCU on August 9, 2019.     Documented by:    TOMAS Naidu, [facility], Chaplain KJ BROWN [facility] Paging Service  [phone]

## 2019-08-09 NOTE — PROGRESS NOTES
Problem: Falls - Risk of  Goal: *Absence of Falls  Description  Document Gabi Molina Fall Risk and appropriate interventions in the flowsheet.   Outcome: Progressing Towards Goal  Note:   Fall Risk Interventions:            Medication Interventions: Patient to call before getting OOB                   Problem: Pulmonary Embolism Care Plan (Adult)  Goal: *Improvement of existing pulmonary embolism  Outcome: Progressing Towards Goal  Goal: *Absence of bleeding  Outcome: Progressing Towards Goal  Goal: *Labs within defined limits  Outcome: Progressing Towards Goal

## 2019-08-10 VITALS
OXYGEN SATURATION: 100 % | HEART RATE: 64 BPM | BODY MASS INDEX: 42.15 KG/M2 | WEIGHT: 268.52 LBS | RESPIRATION RATE: 18 BRPM | HEIGHT: 67 IN | SYSTOLIC BLOOD PRESSURE: 142 MMHG | DIASTOLIC BLOOD PRESSURE: 62 MMHG | TEMPERATURE: 98.2 F

## 2019-08-10 PROBLEM — Z72.0 TOBACCO ABUSE DISORDER: Chronic | Status: ACTIVE | Noted: 2019-08-10

## 2019-08-10 PROCEDURE — 74011250637 HC RX REV CODE- 250/637: Performed by: INTERNAL MEDICINE

## 2019-08-10 PROCEDURE — 74011250636 HC RX REV CODE- 250/636: Performed by: PHYSICIAN ASSISTANT

## 2019-08-10 RX ADMIN — APIXABAN 10 MG: 5 TABLET, FILM COATED ORAL at 11:25

## 2019-08-10 RX ADMIN — HEPARIN SODIUM AND DEXTROSE 18 UNITS/KG/HR: 10000; 5 INJECTION INTRAVENOUS at 09:03

## 2019-08-10 RX ADMIN — VITAMIN D, TAB 1000IU (100/BT) 5000 UNITS: 25 TAB at 09:02

## 2019-08-10 NOTE — DISCHARGE INSTRUCTIONS
Patient Discharge Instructions     Pt Name  Isidra Lawrence   Date of Birth 1975   Age  41 y.o. Medical Record Number  [id number]   PCP Pan Lucia MD    Admit date:  8/8/2019 @    [address]    Room Number  2245/01   Date of Discharge 8/10/2019     Admission Diagnoses:     Acute pulmonary embolism (HCC)          Allergies   Allergen Reactions    Morphine Nausea and Vomiting        It was our pleasure to have taken care of you during your stay at [facility]   You were admitted to the hospital for  Acute pulmonary embolism ([address]    [place]:  Present on Admission:   Acute pulmonary embolism ([address]   HTN (hypertension)   Obesity, morbid ([address]   Tobacco abuse disorder      DIET:  Cardiac Diet     ACTIVITY: Activity as tolerated      · It is important that you take the medication exactly as they are prescribed. · Keep your medication in the bottles provided by the pharmacist and keep a list of the medication names, dosages, and times to be taken in your wallet. · Do not take other medications without consulting your doctor. ADDITIONAL INFORMATION: If you experience any of the following symptoms or have any health problem not listed below, then please call your primary care physician or return to the emergency room if you cannot get hold of your doctor: Fever, chills, nausea, vomiting, diarrhea, change in mentation, falling, bleeding, shortness of breath.     Follow up   Follow-up Information     Follow up With Specialties Details Why Contact Info    Pan Lucia MD Internal Medicine Schedule an appointment as soon as possible for a visit in 2 weeks  [address] [place] [address]  [phone]              I understand that if any problems occur once I am discharged, I am supposed to call my Primary care physician for further care or seek help in the Emergency Department at the nearest [facility] facility. I have had an opportunity to discuss my clinical issues with my doctor and nursing staff. I understand and acknowledge receipt of the above instructions.                                                                                                                                            Physician's or R.N.'s Signature                                                            Date/Time                                                                                                                                              Patient or Representative Signature                                                 Date/Time

## 2019-08-10 NOTE — PROGRESS NOTES
PCU SHIFT NURSING NOTE      Bedside and Verbal shift change report given to Fabian Gonzalez RN (oncoming nurse) by Arely Lizarraga RN (offgoing nurse). Report included the following information SBAR, Kardex, ED Summary, Procedure Summary, Intake/Output, MAR, Recent Results, Med Rec Status, Cardiac Rhythm NSR, Alarm Parameters  and Quality Measures. Shift Summary:   0705--Bedside report received from Arely Lizarraga RN. Patient is resting in bed with no needs at this time. Call bell is within reach. Will monitor. 0820--Patient is sitting up on side of bed eating breakfast with his girlfriend at bedside. Patient assessment complete and charted. Patient has no other needs at this time. Call bell is within reach. Will monitor. 0945--Patient is up cleaning himself in bathroom. No needs at this time. Call bell is within reach. Will monitor. 1000--Waiting on pharmacy to approve medication to be given to patient and then take him off of the heparin gtt. No other needs at this time. Call bell is within reach. Will monitor. 1120--Will give patient his 1200 medications soon and get patient ready for discharge home. No other needs at this time. Call bell is within reach. Will monitor. 1230--Patient is resting in bed watching television. Heparin gtt stopped and patient has been given eliquis 10 mg. Will get patient ready for discharge home. No other needs at this time. Call bell is within reach. Will monitor. 1435--Patient discharge home with girlfriend transporting to home.

## 2019-08-10 NOTE — PROGRESS NOTES
Problem: Falls - Risk of  Goal: *Absence of Falls  Description  Document Kennedy Forbes Fall Risk and appropriate interventions in the flowsheet.   Outcome: Progressing Towards Goal  Note:   Fall Risk Interventions:            Medication Interventions: Bed/chair exit alarm, Evaluate medications/consider consulting pharmacy, Teach patient to arise slowly, Patient to call before getting OOB                   Problem: Patient Education: Go to Patient Education Activity  Goal: Patient/Family Education  Outcome: Progressing Towards Goal     Problem: Pulmonary Embolism Care Plan (Adult)  Goal: *Improvement of existing pulmonary embolism  Outcome: Progressing Towards Goal  Goal: *Absence of bleeding  Outcome: Progressing Towards Goal  Goal: *Labs within defined limits  Outcome: Progressing Towards Goal     Problem: Patient Education: Go to Patient Education Activity  Goal: Patient/Family Education  Outcome: Progressing Towards Goal

## 2019-08-10 NOTE — CONSULTS
[facility]    Name:  Kellee Menjivar  MR#:  [id number]  :  1975  ACCOUNT #:  [de-identified]  DATE OF SERVICE:  2019    REASON FOR CONSULTATION:  Pulmonary embolism, possible hypocoagulable state. REFERRING PHYSICIAN:  Dr. Nuha Robertson. HISTORY OF PRESENT ILLNESS:  The patient is a 46-year-old  gentleman with a past medical history of hypertension, who came to the emergency room with some chest pain and shortness of breath over the week. He underwent a CTA of his chest that showed bilateral large pulmonary emboli, bilateral patchy atelectasis. The patient was admitted to the hospital, started on heparin drip. The patient reports this morning, he is feeling better. Of note, he did report that he used testosterone supplement at about 6 months ago. He has not had any recent travel. No family history of any blood clots. He has never had any blood clots, and we were asked to see him for further evaluation. PAST MEDICAL HISTORY:  Significant for hypertension. CURRENT MEDICATIONS:  He is on heparin drip and vitamin D. ALLERGIES:  TO MORPHINE. SOCIAL HISTORY:  He quit smoking recently. He occasionally drinks. FAMILY HISTORY:  No history of cancers or blood clots. REVIEW OF SYSTEMS:  The 12-point systems done and negative, except for as above. PHYSICAL EXAMINATION:  GENERAL:  Lying in bed, in no acute distress. VITAL SIGNS:  Temperature 98.3, pulse 69, blood pressure 140/71, respirations 20, satting 98% on room air. HEENT:  Normocephalic, atraumatic. Extraocular muscles intact. Oropharynx clear without lesions. NECK:  Supple. No cervical, supraclavicular, or axillary lymphadenopathy appreciated. LUNGS:  Clear to auscultation bilaterally. CARDIOVASCULAR:  Regular rate and rhythm. ABDOMEN:  Normoactive bowel sounds, soft, nontender, nondistended. No hepatosplenomegaly.   EXTREMITIES:  No clubbing, cyanosis, or edema.  NEUROLOGIC:  Nonfocal.    LABORATORY VALUES:  White blood cell count 13.9, hemoglobin 12.4, platelets 521. Chemistry:  Sodium 141, BUN 19, creatinine 1.27, total bilirubin 0.6, ALT 55, AST 27, and alk phos 75. IMPRESSION AND PLAN:  The patient is a 51-year-old gentleman with what appears to be an unprovoked quite large pulmonary embolism. His only risk factor was testosterone, but he had not used that in over 6 months. We will send off a hypercoagulable workup. He is currently on a heparin drip. He could be switched over to Lovenox at some point depending on his insurance, he could be put on NOAC either Eliquis or Xarelto depending on his insurance. We will continue to follow along with you and make further recommendation. He will need to be on anticoagulation for at least 6 months, and we will see what hypercoagulable workup shows.         Jean Murrell MD      [id number]  D:  08/09/2019 10:53  T:  08/09/2019 13:11  JOB #:  [id number]

## 2019-08-11 LAB
CARDIOLIPIN IGA SER IA-ACNC: <9 APL U/ML (ref 0–11)
CARDIOLIPIN IGG SER IA-ACNC: <9 GPL U/ML (ref 0–14)
CARDIOLIPIN IGM SER IA-ACNC: <9 MPL U/ML (ref 0–12)

## 2019-08-12 ENCOUNTER — OFFICE VISIT (OUTPATIENT)
Dept: FAMILY MEDICINE CLINIC | Age: 44
End: 2019-08-12

## 2019-08-12 VITALS
OXYGEN SATURATION: 98 % | SYSTOLIC BLOOD PRESSURE: 128 MMHG | DIASTOLIC BLOOD PRESSURE: 75 MMHG | WEIGHT: 269 LBS | HEART RATE: 80 BPM | HEIGHT: 67 IN | RESPIRATION RATE: 20 BRPM | TEMPERATURE: 99.2 F | BODY MASS INDEX: 42.22 KG/M2

## 2019-08-12 DIAGNOSIS — E55.9 VITAMIN D DEFICIENCY: ICD-10-CM

## 2019-08-12 DIAGNOSIS — I10 HYPERTENSION, WELL CONTROLLED: ICD-10-CM

## 2019-08-12 DIAGNOSIS — I26.99 OTHER ACUTE PULMONARY EMBOLISM WITHOUT ACUTE COR PULMONALE (HCC): Primary | ICD-10-CM

## 2019-08-12 LAB
B2 GLYCOPROT1 IGA SER-ACNC: <9 GPI IGA UNITS (ref 0–25)
B2 GLYCOPROT1 IGG SER-ACNC: <9 GPI IGG UNITS (ref 0–20)
B2 GLYCOPROT1 IGM SER-ACNC: <9 GPI IGM UNITS (ref 0–32)

## 2019-08-12 NOTE — PROGRESS NOTES
Chief Complaint   Patient presents with   [facility] Follow Up     blood clots in lungs     HPI:  Mukesh Cooley is a 41 y.o. AA male with hypertension,  Morbid obesity,  Tobacco abuse disorder presents for hospital follow up  Patient was admitted for acute pulmonary embolism 8/8/19-8/10/19. He was discharged on Eliquis starter pack. He was given form to complete for medication assistance. Primary reason for visit today is to complete the form so as to be able to continue anticoagulation therapy    Discussed the patient's BMI with him.    BMI follow up plan is as follows: Dietary management education, guidance, counseling, encourage exercise, monitor weight, prescribed dietary intake    Review of Systems  As per hpi    Past Medical History:   Diagnosis Date    Hypertension     Rotator cuff injury      Past Surgical History:   Procedure Laterality Date    HX BACK SURGERY  1999    HX ORTHOPAEDIC      finger     HX SHOULDER ARTHROSCOPY       Social History     Socioeconomic History    Marital status: SINGLE     Spouse name: Not on file    Number of children: Not on file    Years of education: Not on file    Highest education level: Not on file   Tobacco Use    Smoking status: Former Smoker     Packs/day: 0.50     Years: 0.00     Pack years: 0.00    Smokeless tobacco: Never Used   Substance and Sexual Activity    Alcohol use: Yes     Comment: occasionally    Drug use: Yes     Types: Marijuana    Sexual activity: Yes     Partners: Female     Birth control/protection: None     Family History   Problem Relation Age of Onset    Asthma Mother     Hypertension Mother     Hypertension Father     Hypertension Paternal Uncle     Diabetes Paternal Grandmother      Current Outpatient Medications   Medication Sig Dispense Refill    apixaban (ELIQUIS) 5 mg (74 tabs) starter pack Take 10 mg (two 5 mg tablets) by mouth twice a day for 7 days   Followed by 5 mg (one 5 mg tablet) by mouth twice a day 1 Dose Pack 0     Allergies   Allergen Reactions    Morphine Nausea and Vomiting       Objective:  Visit Vitals  /75   Pulse 80   Temp 99.2 °F (37.3 °C) (Oral)   Resp 20   Ht 5' 7\" (1.702 m)   Wt 269 lb (122 kg)   SpO2 98%   BMI 42.13 kg/m²     Physical Exam:   General appearance - alert, well appearing in no distress  Mental status - alert, oriented to person, place, and time  EYE-PERRL, EOMI  ENT-ENT exam normal, no neck nodes or sinus tenderness  Mouth - mucous membranes moist, pharynx normal without lesions  Neck - supple, no significant adenopathy   Chest - clear to auscultation, no wheezes, rales or rhonchi  Heart - normal rate, regular rhythm, normal    Abdomen - soft, nontender, nondistended, no organomegaly  Ext-peripheral pulses normal, no pedal edema  Neuro - no focal findings   Back-full range of motion, no tenderness, palpable spasm or pain on motion     Assessment/Plan:  Diagnoses and all orders for this visit:    Other acute pulmonary embolism without acute cor pulmonale (HCC)    Hypertension, well controlled    Vitamin D deficiency      Patient Instructions          Pulmonary Embolism: Care Instructions  Your Care Instructions    Pulmonary embolism is the sudden blockage of an artery in the lung. Blood clots in the deep veins of the leg or pelvis (deep vein thrombosis, or DVT) are the most common cause. These blood clots can travel to the lungs. Pulmonary embolism can be very serious. Because you have had one pulmonary embolism, you are at greater risk for having another one. But you can take steps to prevent another pulmonary embolism by following your doctor's instructions. You will probably take a prescription blood-thinning medicine to prevent blood clots. A blood thinner can stop a blood clot from growing larger and prevent new clots from forming. Follow-up care is a key part of your treatment and safety.  Be sure to make and go to all appointments, and call your doctor if you are having problems. It's also a good idea to know your test results and keep a list of the medicines you take. How can you care for yourself at home? · Take your medicines exactly as prescribed. Call your doctor if you think you are having a problem with your medicine. You will get more details on the specific medicines your doctor prescribes. · If you are taking a blood thinner, be sure you get instructions about how to take your medicine safely. Blood thinners can cause serious bleeding problems. Preventing future pulmonary embolisms  · Exercise. Keep blood moving in your legs to keep clots from forming. If you are traveling by car, stop every hour or so. Get out and walk around for a few minutes. If you are traveling by bus, train, or plane, get out of your seat and walk up and down the aisles every hour or so. You also can do leg exercises while you are seated. Pump your feet up and down by pulling your toes up toward your knees then pointing them down. · Get up out of bed as soon as possible after an illness or surgery. · Do not smoke. If you need help quitting, talk to your doctor about stop-smoking programs and medicines. These can increase your chances of quitting for good. · Check with your doctor before taking hormone or birth control pills. These may increase your risk of blot clots. · Ask your doctor about wearing compression stockings to help prevent blood clots in your legs. There are different types of stockings, and they need to fit right. So your doctor will recommend what you need. When should you call for help? Call 911 anytime you think you may need emergency care.  For example, call if:    · You have shortness of breath.     · You have chest pain.     · You passed out (lost consciousness).     · You cough up blood.    Call your doctor now or seek immediate medical care if:    · You have new or worsening pain or swelling in your leg.    Watch closely for changes in your health, and be sure to contact your doctor if:    · You do not get better as expected. Where can you learn more? Go to http://orly-zachery.info/. Enter R566 in the search box to learn more about \"Pulmonary Embolism: Care Instructions. \"  Current as of: September 26, 2018  Content Version: 12.1  © 6886-9960 [employer]. Care instructions adapted under license by [facility] (which disclaims liability or warranty for this information). If you have questions about a medical condition or this instruction, always ask your healthcare professional. [address] any warranty or liability for your use of this information. Body Mass Index: Care Instructions  Your Care Instructions    Body mass index (BMI) can help you see if your weight is raising your risk for health problems. It uses a formula to compare how much you weigh with how tall you are. · A BMI lower than 18.5 is considered underweight. · A BMI between 18.5 and 24.9 is considered healthy. · A BMI between 25 and 29.9 is considered overweight. A BMI of 30 or higher is considered obese. If your BMI is in the normal range, it means that you have a lower risk for weight-related health problems. If your BMI is in the overweight or obese range, you may be at increased risk for weight-related health problems, such as high blood pressure, heart disease, stroke, arthritis or joint pain, and diabetes. If your BMI is in the underweight range, you may be at increased risk for health problems such as fatigue, lower protection (immunity) against illness, muscle loss, bone loss, hair loss, and hormone problems. BMI is just one measure of your risk for weight-related health problems. You may be at higher risk for health problems if you are not active, you eat an unhealthy diet, or you drink too much alcohol or use tobacco products. Follow-up care is a key part of your treatment and safety.  Be sure to make and go to all appointments, and call your doctor if you are having problems. It's also a good idea to know your test results and keep a list of the medicines you take. How can you care for yourself at home? · Practice healthy eating habits. This includes eating plenty of fruits, vegetables, whole grains, lean protein, and low-fat dairy. · If your doctor recommends it, get more exercise. Walking is a good choice. Bit by bit, increase the amount you walk every day. Try for at least 30 minutes on most days of the week. · Do not smoke. Smoking can increase your risk for health problems. If you need help quitting, talk to your doctor about stop-smoking programs and medicines. These can increase your chances of quitting for good. · Limit alcohol to 2 drinks a day for men and 1 drink a day for women. Too much alcohol can cause health problems. If you have a BMI higher than 25  · Your doctor may do other tests to check your risk for weight-related health problems. This may include measuring the distance around your waist. A waist measurement of more than 40 inches in men or 35 inches in women can increase the risk of weight-related health problems. · Talk with your doctor about steps you can take to stay healthy or improve your health. You may need to make lifestyle changes to lose weight and stay healthy, such as changing your diet and getting regular exercise. If you have a BMI lower than 18.5  · Your doctor may do other tests to check your risk for health problems. · Talk with your doctor about steps you can take to stay healthy or improve your health. You may need to make lifestyle changes to gain or maintain weight and stay healthy, such as getting more healthy foods in your diet and doing exercises to build muscle. Where can you learn more? Go to http://orly-zachery.info/. Enter S176 in the search box to learn more about \"Body Mass Index: Care Instructions. \"  Current as of: October 13, 2016  Content Version: 11.4  © 3667-9801 [employer]. Care instructions adapted under license by [employer] (which disclaims liability or warranty for this information). If you have questions about a medical condition or this instruction, always ask your healthcare professional. [address] any warranty or liability for your use of this information. Follow-up and Dispositions    · Return if symptoms worsen or fail to improve, for routine follow up.

## 2019-08-13 LAB
AT III PPP CHRO-ACNC: 94 % (ref 75–135)
DRVVT MIX, 117894: 44.9 SEC (ref 0–47)
PROT C AG PPP IA-ACNC: 84 % (ref 60–150)
PROT C PPP-ACNC: 93 % (ref 73–180)
PROT S ACT/NOR PPP: 103 % (ref 57–157)
PROT S ACT/NOR PPP: 88 % (ref 63–140)
PROT S PPP-ACNC: 131 % (ref 60–150)

## 2019-08-13 NOTE — PATIENT INSTRUCTIONS
Pulmonary Embolism: Care Instructions  Your Care Instructions    Pulmonary embolism is the sudden blockage of an artery in the lung. Blood clots in the deep veins of the leg or pelvis (deep vein thrombosis, or DVT) are the most common cause. These blood clots can travel to the lungs. Pulmonary embolism can be very serious. Because you have had one pulmonary embolism, you are at greater risk for having another one. But you can take steps to prevent another pulmonary embolism by following your doctor's instructions. You will probably take a prescription blood-thinning medicine to prevent blood clots. A blood thinner can stop a blood clot from growing larger and prevent new clots from forming. Follow-up care is a key part of your treatment and safety. Be sure to make and go to all appointments, and call your doctor if you are having problems. It's also a good idea to know your test results and keep a list of the medicines you take. How can you care for yourself at home? · Take your medicines exactly as prescribed. Call your doctor if you think you are having a problem with your medicine. You will get more details on the specific medicines your doctor prescribes. · If you are taking a blood thinner, be sure you get instructions about how to take your medicine safely. Blood thinners can cause serious bleeding problems. Preventing future pulmonary embolisms  · Exercise. Keep blood moving in your legs to keep clots from forming. If you are traveling by car, stop every hour or so. Get out and walk around for a few minutes. If you are traveling by bus, train, or plane, get out of your seat and walk up and down the aisles every hour or so. You also can do leg exercises while you are seated. Pump your feet up and down by pulling your toes up toward your knees then pointing them down. · Get up out of bed as soon as possible after an illness or surgery. · Do not smoke.  If you need help quitting, talk to your doctor about stop-smoking programs and medicines. These can increase your chances of quitting for good. · Check with your doctor before taking hormone or birth control pills. These may increase your risk of blot clots. · Ask your doctor about wearing compression stockings to help prevent blood clots in your legs. There are different types of stockings, and they need to fit right. So your doctor will recommend what you need. When should you call for help? Call 911 anytime you think you may need emergency care. For example, call if:    · You have shortness of breath.     · You have chest pain.     · You passed out (lost consciousness).     · You cough up blood.    Call your doctor now or seek immediate medical care if:    · You have new or worsening pain or swelling in your leg.    Watch closely for changes in your health, and be sure to contact your doctor if:    · You do not get better as expected. Where can you learn more? Go to http://orly-zachery.info/. Enter B199 in the search box to learn more about \"Pulmonary Embolism: Care Instructions. \"  Current as of: September 26, 2018  Content Version: 12.1  © 6074-2385 [employer]. Care instructions adapted under license by [employer] (which disclaims liability or warranty for this information). If you have questions about a medical condition or this instruction, always ask your healthcare professional. [address] any warranty or liability for your use of this information. Body Mass Index: Care Instructions  Your Care Instructions    Body mass index (BMI) can help you see if your weight is raising your risk for health problems. It uses a formula to compare how much you weigh with how tall you are. · A BMI lower than 18.5 is considered underweight. · A BMI between 18.5 and 24.9 is considered healthy. · A BMI between 25 and 29.9 is considered overweight.  A BMI of 30 or higher is considered obese.  If your BMI is in the normal range, it means that you have a lower risk for weight-related health problems. If your BMI is in the overweight or obese range, you may be at increased risk for weight-related health problems, such as high blood pressure, heart disease, stroke, arthritis or joint pain, and diabetes. If your BMI is in the underweight range, you may be at increased risk for health problems such as fatigue, lower protection (immunity) against illness, muscle loss, bone loss, hair loss, and hormone problems. BMI is just one measure of your risk for weight-related health problems. You may be at higher risk for health problems if you are not active, you eat an unhealthy diet, or you drink too much alcohol or use tobacco products. Follow-up care is a key part of your treatment and safety. Be sure to make and go to all appointments, and call your doctor if you are having problems. It's also a good idea to know your test results and keep a list of the medicines you take. How can you care for yourself at home? · Practice healthy eating habits. This includes eating plenty of fruits, vegetables, whole grains, lean protein, and low-fat dairy. · If your doctor recommends it, get more exercise. Walking is a good choice. Bit by bit, increase the amount you walk every day. Try for at least 30 minutes on most days of the week. · Do not smoke. Smoking can increase your risk for health problems. If you need help quitting, talk to your doctor about stop-smoking programs and medicines. These can increase your chances of quitting for good. · Limit alcohol to 2 drinks a day for men and 1 drink a day for women. Too much alcohol can cause health problems. If you have a BMI higher than 25  · Your doctor may do other tests to check your risk for weight-related health problems.  This may include measuring the distance around your waist. A waist measurement of more than 40 inches in men or 35 inches in women can increase the risk of weight-related health problems. · Talk with your doctor about steps you can take to stay healthy or improve your health. You may need to make lifestyle changes to lose weight and stay healthy, such as changing your diet and getting regular exercise. If you have a BMI lower than 18.5  · Your doctor may do other tests to check your risk for health problems. · Talk with your doctor about steps you can take to stay healthy or improve your health. You may need to make lifestyle changes to gain or maintain weight and stay healthy, such as getting more healthy foods in your diet and doing exercises to build muscle. Where can you learn more? Go to http://olry-zachery.info/. Enter S176 in the search box to learn more about \"Body Mass Index: Care Instructions. \"  Current as of: October 13, 2016  Content Version: 11.4  © 4084-5700 [employer]. Care instructions adapted under license by [employer] (which disclaims liability or warranty for this information). If you have questions about a medical condition or this instruction, always ask your healthcare professional. [address] any warranty or liability for your use of this information.

## 2019-08-14 LAB
F5 GENE MUT ANL BLD/T: NORMAL
INTERPRETATION, 117893: ABNORMAL
SCREEN APTT: 42.4 SEC (ref 0–51.9)
SCREEN DRVVT: 60 SEC (ref 0–47)

## 2019-08-15 LAB — F2 GENE MUT ANL BLD/T: NORMAL

## 2019-08-16 NOTE — TELEPHONE ENCOUNTER
----- Message from Donaldo Rice sent at 8/16/2019 10:29 AM EDT -----  Regarding: Dr. Rachael Crawford (if not patient): Amber Hernandez from [employer] Patient Assistant       Relationship of caller (if not patient):N/a      Best contact number(s):[phone]      Name of medication and dosage if known: Elliquise  1 tab twice a day       Is patient out of this medication (yes/no):n/a      Pharmacy name:    Pharmacy listed in chart? (yes/no):  Pharmacy phone number:      Details to clarify the request:Agustina , is requesting to have a prescription sent over for \" Elliquise\" .  Pt has no refills       Donaldo Rice

## 2019-08-19 DIAGNOSIS — I26.99 OTHER ACUTE PULMONARY EMBOLISM WITHOUT ACUTE COR PULMONALE (HCC): Primary | ICD-10-CM

## 2019-08-23 ENCOUNTER — TELEPHONE (OUTPATIENT)
Dept: FAMILY MEDICINE CLINIC | Age: 44
End: 2019-08-23

## 2019-08-23 DIAGNOSIS — I26.99 OTHER ACUTE PULMONARY EMBOLISM WITHOUT ACUTE COR PULMONALE (HCC): ICD-10-CM

## 2019-08-23 NOTE — TELEPHONE ENCOUNTER
[address] [facility] in  Ref to [facility] # of tablets added on provider form       Best number to reach them is [phone]

## 2019-08-23 NOTE — TELEPHONE ENCOUNTER
Contacted [facility] to see what they needed. [facility] stated that they need a script Faxed to them at  [phone]. Faxed script to [facility] at fax number [phone]. Fax confirmation received.

## 2020-01-14 ENCOUNTER — TELEPHONE (OUTPATIENT)
Dept: FAMILY MEDICINE CLINIC | Age: 45
End: 2020-01-14

## 2020-01-14 NOTE — TELEPHONE ENCOUNTER
Called patient gave an appt  For 1/15/2020. Suggest if worst please go to  [facility].

## 2020-01-14 NOTE — TELEPHONE ENCOUNTER
Pt called to set up an appmtn for an ear ache     He was offered 2 times for today but refused as he has to take his daughter to the doctor   There are no other openings this week to offer so he agrees to call and check on cancellations

## 2020-01-15 ENCOUNTER — OFFICE VISIT (OUTPATIENT)
Dept: FAMILY MEDICINE CLINIC | Age: 45
End: 2020-01-15

## 2020-01-15 VITALS
WEIGHT: 274 LBS | DIASTOLIC BLOOD PRESSURE: 61 MMHG | SYSTOLIC BLOOD PRESSURE: 117 MMHG | OXYGEN SATURATION: 100 % | HEART RATE: 66 BPM | TEMPERATURE: 97.7 F | RESPIRATION RATE: 20 BRPM | BODY MASS INDEX: 43 KG/M2 | HEIGHT: 67 IN

## 2020-01-15 DIAGNOSIS — I34.0 MILD MITRAL VALVE REGURGITATION: ICD-10-CM

## 2020-01-15 DIAGNOSIS — I26.99 OTHER ACUTE PULMONARY EMBOLISM WITHOUT ACUTE COR PULMONALE (HCC): ICD-10-CM

## 2020-01-15 DIAGNOSIS — G47.33 OSA (OBSTRUCTIVE SLEEP APNEA): ICD-10-CM

## 2020-01-15 DIAGNOSIS — M17.0 PRIMARY OSTEOARTHRITIS OF BOTH KNEES: ICD-10-CM

## 2020-01-15 DIAGNOSIS — I10 HYPERTENSION, WELL CONTROLLED: Primary | ICD-10-CM

## 2020-01-15 RX ORDER — DEXTROMETHORPHAN HYDROBROMIDE, GUAIFENESIN 5; 100 MG/5ML; MG/5ML
650 LIQUID ORAL
Qty: 60 TAB | Refills: 2 | Status: SHIPPED | OUTPATIENT
Start: 2020-01-15 | End: 2020-10-27 | Stop reason: SDUPTHER

## 2020-01-15 RX ORDER — DEXTROMETHORPHAN HYDROBROMIDE, GUAIFENESIN 5; 100 MG/5ML; MG/5ML
650 LIQUID ORAL EVERY 8 HOURS
Qty: 60 TAB | Refills: 2 | Status: SHIPPED | OUTPATIENT
Start: 2020-01-15 | End: 2020-01-15 | Stop reason: SDUPTHER

## 2020-01-15 RX ORDER — METHYLPREDNISOLONE 4 MG/1
TABLET ORAL
COMMUNITY
Start: 2019-12-02 | End: 2020-03-18 | Stop reason: ALTCHOICE

## 2020-01-15 NOTE — PATIENT INSTRUCTIONS
Knee Arthritis: Care Instructions  Your Care Instructions    Knee arthritis is a breakdown of the cartilage that cushions your knee joint. When the cartilage wears down, your bones rub against each other. This causes pain and stiffness. Knee arthritis tends to get worse with time. Treatment for knee arthritis involves reducing pain, making the leg muscles stronger, and staying at a healthy body weight. The treatment usually does not improve the health of the cartilage, but it can reduce pain and improve how well your knee works. You can take simple measures to protect your knee joints, ease your pain, and help you stay active. Follow-up care is a key part of your treatment and safety. Be sure to make and go to all appointments, and call your doctor if you are having problems. It's also a good idea to know your test results and keep a list of the medicines you take. How can you care for yourself at home? · Know that knee arthritis will cause more pain on some days than on others. · Stay at a healthy weight. Lose weight if you are overweight. When you stand up, the pressure on your knees from every pound of body weight is multiplied four times. So if you lose 10 pounds, you will reduce the pressure on your knees by 40 pounds. · Talk to your doctor or physical therapist about exercises that will help ease joint pain. ? Stretch to help prevent stiffness and to prevent injury before you exercise. You may enjoy gentle forms of yoga to help keep your knee joints and muscles flexible. ? Walk instead of jog.  ? Ride a bike. This makes your thigh muscles stronger and takes pressure off your knee. ? Wear well-fitting and comfortable shoes. ? Exercise in chest-deep water. This can help you exercise longer with less pain. ? Avoid exercises that include squatting or kneeling. They can put a lot of strain on your knees.   ? Talk to your doctor to make sure that the exercise you do is not making the arthritis worse.  · Do not sit for long periods of time. Try to walk once in a while to keep your knee from getting stiff. · Ask your doctor or physical therapist whether shoe inserts may reduce your arthritis pain. · If you can afford it, get new athletic shoes at least every year. This can help reduce the strain on your knees. · Use a device to help you do everyday activities. ? A cane or walking stick can help you keep your balance when you walk. Hold the cane or walking stick in the hand opposite the painful knee. ? If you feel like you may fall when you walk, try using crutches or a front-wheeled walker. These can prevent falls that could cause more damage to your knee. ? A knee brace may help keep your knee stable and prevent pain. ? You also can use other things to make life easier, such as a higher toilet seat and handrails in the bathtub or shower. · Take pain medicines exactly as directed. ? Do not wait until you are in severe pain. You will get better results if you take it sooner. ? If you are not taking a prescription pain medicine, take an over-the-counter medicine such as acetaminophen (Tylenol), ibuprofen (Advil, Motrin), or naproxen (Aleve). Read and follow all instructions on the label. ? Do not take two or more pain medicines at the same time unless the doctor told you to. Many pain medicines have acetaminophen, which is Tylenol. Too much acetaminophen (Tylenol) can be harmful. ? Tell your doctor if you take a blood thinner, have diabetes, or have allergies to shellfish. · Ask your doctor if you might benefit from a shot of steroid medicine into your knee. This may provide pain relief for several months. · Many people take the supplements glucosamine and chondroitin for osteoarthritis. Some people feel they help, but the medical research does not show that they work. Talk to your doctor before you take these supplements. When should you call for help?   Call your doctor now or seek immediate medical care if:    · You have sudden swelling, warmth, or pain in your knee.     · You have knee pain and a fever or rash.     · You have such bad pain that you cannot use your knee.    Watch closely for changes in your health, and be sure to contact your doctor if you have any problems. Where can you learn more? Go to http://orly-zachery.info/. Enter E544 in the search box to learn more about \"Knee Arthritis: Care Instructions. \"  Current as of: April 1, 2019  Content Version: 12.2  © 7625-1135 [employer]. Care instructions adapted under license by [employer] (which disclaims liability or warranty for this information). If you have questions about a medical condition or this instruction, always ask your healthcare professional. [address] any warranty or liability for your use of this information.

## 2020-01-15 NOTE — PROGRESS NOTES
Chief Complaint   Patient presents with    Ear Pain    ED Follow-up    Sleep Problem     HPI:  Edith Mijares is a 45 y.o. AA male with pulmonary embolism,  hypertension,  Morbid obesity,  Tobacco abuse disorder presents for ER follow up  Patient has c/o ear pain on both sides. Denies ringing, drain, fever/chills. Patient is currently on Eliquis for pulmonary. Patient requested to see a cardiologist because he was told during admission he has a leaky valve. Also, he has additional complaints of difficulty falling and staying asleep. Review of Systems  As per hpi    Past Medical History:   Diagnosis Date    Hypertension     Rotator cuff injury      Past Surgical History:   Procedure Laterality Date    HX BACK SURGERY  1999    HX ORTHOPAEDIC      finger     HX SHOULDER ARTHROSCOPY       Social History     Socioeconomic History    Marital status: SINGLE     Spouse name: Not on file    Number of children: Not on file    Years of education: Not on file    Highest education level: Not on file   Tobacco Use    Smoking status: Former Smoker     Packs/day: 0.50     Years: 0.00     Pack years: 0.00    Smokeless tobacco: Never Used   Substance and Sexual Activity    Alcohol use: Yes     Comment: occasionally    Drug use: Yes     Types: Marijuana    Sexual activity: Yes     Partners: Female     Birth control/protection: None     Family History   Problem Relation Age of Onset    Asthma Mother     Hypertension Mother     Hypertension Father     Hypertension Paternal Uncle     Diabetes Paternal Grandmother      Current Outpatient Medications   Medication Sig Dispense Refill    methylPREDNISolone (MEDROL DOSEPACK) 4 mg tablet       apixaban (ELIQUIS) 5 mg tablet Take 1 Tab by mouth two (2) times a day.  180 Tab 3     Allergies   Allergen Reactions    Morphine Nausea and Vomiting     Objective:  Visit Vitals  /61   Pulse 66   Temp 97.7 °F (36.5 °C) (Oral)   Resp 20   Ht 5' 7\" (1.702 m)   Wt 274 lb (124.3 kg)   SpO2 100%   BMI 42.91 kg/m²     Physical Exam:   General appearance - alert, well appearing in no distress  Mental status - alert, oriented to person, place, and time  EYE-PERRL, EOMI  ENT-ENT exam normal, no neck nodes or sinus tenderness  Neck - supple, no significant adenopathy   Chest - clear to auscultation, no wheezes, rales or rhonchi  Heart - normal rate, regular rhythm, normal blood pressure   Abdomen - soft, nontender, nondistended, no organomegaly  Ext-peripheral pulses normal, no pedal edema  Neuro -alert, oriented, normal speech, no focal findings   Back-full range of motion, no tenderness, palpable spasm or pain on motion     Assessment/Plan:  Diagnoses and all orders for this visit:    Hypertension, well controlled  -     REFERRAL TO CARDIOLOGY    Other acute pulmonary embolism without acute cor pulmonale (HCC)  -     apixaban (ELIQUIS) 5 mg tablet; Take 1 Tab by mouth two (2) times a day., Normal, Disp-180 Tab, R-3, DOMINGA    Primary osteoarthritis of both knees  -     Discontinue: acetaminophen (TYLENOL) 650 mg TbER; Take 1 Tab by mouth every eight (8) hours. , Normal, Disp-60 Tab, R-2  -     acetaminophen (TYLENOL) 650 mg TbER; Take 1 Tab by mouth every eight (8) hours as needed for Pain., Print, Disp-60 Tab, R-2    Mild mitral valve regurgitation  -     REFERRAL TO CARDIOLOGY    CLAUDIA (obstructive sleep apnea)  -     REFERRAL TO SLEEP STUDIES      Patient Instructions          Knee Arthritis: Care Instructions  Your Care Instructions    Knee arthritis is a breakdown of the cartilage that cushions your knee joint. When the cartilage wears down, your bones rub against each other. This causes pain and stiffness. Knee arthritis tends to get worse with time. Treatment for knee arthritis involves reducing pain, making the leg muscles stronger, and staying at a healthy body weight.  The treatment usually does not improve the health of the cartilage, but it can reduce pain and improve how well your knee works. You can take simple measures to protect your knee joints, ease your pain, and help you stay active. Follow-up care is a key part of your treatment and safety. Be sure to make and go to all appointments, and call your doctor if you are having problems. It's also a good idea to know your test results and keep a list of the medicines you take. How can you care for yourself at home? · Know that knee arthritis will cause more pain on some days than on others. · Stay at a healthy weight. Lose weight if you are overweight. When you stand up, the pressure on your knees from every pound of body weight is multiplied four times. So if you lose 10 pounds, you will reduce the pressure on your knees by 40 pounds. · Talk to your doctor or physical therapist about exercises that will help ease joint pain. ? Stretch to help prevent stiffness and to prevent injury before you exercise. You may enjoy gentle forms of yoga to help keep your knee joints and muscles flexible. ? Walk instead of jog.  ? Ride a bike. This makes your thigh muscles stronger and takes pressure off your knee. ? Wear well-fitting and comfortable shoes. ? Exercise in chest-deep water. This can help you exercise longer with less pain. ? Avoid exercises that include squatting or kneeling. They can put a lot of strain on your knees. ? Talk to your doctor to make sure that the exercise you do is not making the arthritis worse. · Do not sit for long periods of time. Try to walk once in a while to keep your knee from getting stiff. · Ask your doctor or physical therapist whether shoe inserts may reduce your arthritis pain. · If you can afford it, get new athletic shoes at least every year. This can help reduce the strain on your knees. · Use a device to help you do everyday activities. ? A cane or walking stick can help you keep your balance when you walk. Hold the cane or walking stick in the hand opposite the painful knee. ?  If you feel like you may fall when you walk, try using crutches or a front-wheeled walker. These can prevent falls that could cause more damage to your knee. ? A knee brace may help keep your knee stable and prevent pain. ? You also can use other things to make life easier, such as a higher toilet seat and handrails in the bathtub or shower. · Take pain medicines exactly as directed. ? Do not wait until you are in severe pain. You will get better results if you take it sooner. ? If you are not taking a prescription pain medicine, take an over-the-counter medicine such as acetaminophen (Tylenol), ibuprofen (Advil, Motrin), or naproxen (Aleve). Read and follow all instructions on the label. ? Do not take two or more pain medicines at the same time unless the doctor told you to. Many pain medicines have acetaminophen, which is Tylenol. Too much acetaminophen (Tylenol) can be harmful. ? Tell your doctor if you take a blood thinner, have diabetes, or have allergies to shellfish. · Ask your doctor if you might benefit from a shot of steroid medicine into your knee. This may provide pain relief for several months. · Many people take the supplements glucosamine and chondroitin for osteoarthritis. Some people feel they help, but the medical research does not show that they work. Talk to your doctor before you take these supplements. When should you call for help? Call your doctor now or seek immediate medical care if:    · You have sudden swelling, warmth, or pain in your knee.     · You have knee pain and a fever or rash.     · You have such bad pain that you cannot use your knee.    Watch closely for changes in your health, and be sure to contact your doctor if you have any problems. Where can you learn more? Go to http://orly-zachery.info/. Enter S279 in the search box to learn more about \"Knee Arthritis: Care Instructions. \"  Current as of: April 1, 2019  Content Version: 12.2  © 9165-2140 Healthwise, Incorporated. Care instructions adapted under license by [employer] (which disclaims liability or warranty for this information). If you have questions about a medical condition or this instruction, always ask your healthcare professional. [place] [address] any warranty or liability for your use of this information. Follow-up and Dispositions    · Return 3-4 months, for routine follow up.

## 2020-03-17 ENCOUNTER — TELEPHONE (OUTPATIENT)
Dept: FAMILY MEDICINE CLINIC | Age: 45
End: 2020-03-17

## 2020-03-17 NOTE — TELEPHONE ENCOUNTER
----- Message from Asha Cooney sent at 3/17/2020  9:47 AM EDT -----  Regarding: MD Ferguson/telephone  Contact: [phone]    Caller's first and last name: pt  Reason for call: Medication clarification  Callback required yes/no and why: yes  Best contact number(s): [phone]  Details to clarify the request: Pt would like to be prescribed a different medications for allergies and sinus. Pt is taking flonase and mucinex. Pt states medicines are not working. Pt also has not received antibiotic for ear infection.

## 2020-03-18 ENCOUNTER — OFFICE VISIT (OUTPATIENT)
Dept: CARDIOLOGY CLINIC | Age: 45
End: 2020-03-18

## 2020-03-18 VITALS
RESPIRATION RATE: 18 BRPM | WEIGHT: 273 LBS | BODY MASS INDEX: 42.85 KG/M2 | SYSTOLIC BLOOD PRESSURE: 130 MMHG | OXYGEN SATURATION: 98 % | DIASTOLIC BLOOD PRESSURE: 82 MMHG | HEART RATE: 87 BPM | HEIGHT: 67 IN

## 2020-03-18 DIAGNOSIS — I10 ESSENTIAL HYPERTENSION: ICD-10-CM

## 2020-03-18 DIAGNOSIS — Z72.0 TOBACCO ABUSE DISORDER: ICD-10-CM

## 2020-03-18 DIAGNOSIS — I34.0 MITRAL VALVE INSUFFICIENCY, UNSPECIFIED ETIOLOGY: Primary | ICD-10-CM

## 2020-03-18 DIAGNOSIS — Z86.711 HISTORY OF PULMONARY EMBOLISM: ICD-10-CM

## 2020-03-18 DIAGNOSIS — I73.9 CLAUDICATION OF LOWER EXTREMITY (HCC): ICD-10-CM

## 2020-03-18 NOTE — PROGRESS NOTES
Subjective/HPI:     Vianca Toth is a 45 y.o. male is here for new patient consultation. The patient has medical hx significant for HTN and bibiana PE 8/19 . The pt presents today to f/u after he had an echo in the hospital in August and was told he had a leaky heart valve. He wasn't sure what further care should occur related to this. Additionally he has noticed for over 6 months now with exercise or standing that he will get a cramp in his left leg that travels to his calf. His right leg will be fatigued and heavy. He denies edema, varicose veins. Symptoms started in the least 6 months ago. He denies SOB/BORDEN, orthopnea, PND, CP, palpitations, dizziness, near syncope or syncope. Previous cardiac evaluation includes echo. Family med hx significant for HTN. Patient Active Problem List    Diagnosis Date Noted    Tobacco abuse disorder 08/10/2019    Acute pulmonary embolism ([address] 08/08/2019    Obesity, morbid ([address] 04/04/2019    HTN (hypertension) 02/18/2016      Karla Romo MD  Past Medical History:   Diagnosis Date    Hypertension     Pulmonary embolism ([address]     Rotator cuff injury     Valvular heart disease     trace mitral valve regurg 8/19      Past Surgical History:   Procedure Laterality Date    HX BACK SURGERY  1999    HX ORTHOPAEDIC      finger     HX SHOULDER ARTHROSCOPY       Allergies   Allergen Reactions    Morphine Nausea and Vomiting      Family History   Problem Relation Age of Onset    Asthma Mother     Hypertension Mother     Hypertension Father     Hypertension Paternal Uncle     Diabetes Paternal Grandmother       Current Outpatient Medications   Medication Sig    apixaban (ELIQUIS) 5 mg tablet Take 1 Tab by mouth two (2) times a day.  acetaminophen (TYLENOL) 650 mg TbER Take 1 Tab by mouth every eight (8) hours as needed for Pain. No current facility-administered medications for this visit.        Vitals:    03/18/20 0912 03/18/20 0921   BP: 120/80 130/82 Pulse: 87    Resp: 18    SpO2: 98%    Weight: 273 lb (123.8 kg)    Height: 5' 7\" (1.702 m)        I have reviewed the nurses notes, vitals, problem list, allergy list, medical history, family, social history and medications. Review of Symptoms:  General: Pt denies excessive weight gain or loss. Pt is able to conduct ADL's  HEENT: Denies blurred vision, headaches, epistaxis and difficulty swallowing. Respiratory: Denies shortness of breath, BORDEN, wheezing or stridor. Cardiovascular: Denies precordial pain, palpitations, edema or PND  Gastrointestinal: Denies poor appetite, indigestion, abdominal pain or blood in stool  Urinary: Denies dysuria, pyuria  Musculoskeletal: Denies pain or swelling from muscles or joints. + cramping pain/heaviness in his right leg  Neurologic: Denies tremor, paresthesias, or sensory motor disturbance  Skin: Denies rash, itching or texture change. Psych: Denies depression        Physical Exam:      General: Well developed, cooperative, alert in no acute distress, appears states age. HEENT: Supple, No carotid bruits, no JVD, trach is midline. PERRL, EOM intact  Heart:  Normal S1/S2 negative S3 or S4. Regular, no murmur, gallop or rub. Respiratory: Clear bilaterally x 4, no wheezing or rales  Abdomen:   Soft, non-tender, no masses, bowel sounds are active. Extremities:  No edema, normal cap refill, no cyanosis, atraumatic. Neuro: A&Ox3, speech clear, gait stable. Skin: Skin color is normal. No rashes or lesions.  Non diaphoretic  Vascular: 2+ pulses symmetric in all extremities    Cardiographics    ECG: NSR    Results for orders placed or performed during the hospital encounter of 08/08/19   EKG, 12 LEAD, INITIAL   Result Value Ref Range    Ventricular Rate 97 BPM    Atrial Rate 97 BPM    P-R Interval 130 ms    QRS Duration 82 ms    Q-T Interval 306 ms    QTC Calculation (Bezet) 388 ms    Calculated P Axis 40 degrees    Calculated R Axis 36 degrees    Calculated T Axis 32 degrees Diagnosis       Normal sinus rhythm  Normal ECG  When compared with ECG of 31-JUL-2019 20:01,  Minimal criteria for Inferior infarct are no longer present  T wave amplitude has decreased in Lateral leads  Confirmed by OBIE Flor ([id number]) on 8/8/2019 10:35:53 PM           Cardiology Labs:    Lab Results   Component Value Date/Time    Cholesterol, total 209 (H) 04/04/2019 11:11 AM    HDL Cholesterol 53 04/04/2019 11:11 AM    LDL, calculated 137 (H) 04/04/2019 11:11 AM    LDL, calculated 110 (H) 02/22/2016 09:46 AM    VLDL, calculated 19 04/04/2019 11:11 AM     Lab Results   Component Value Date/Time    Sodium 141 08/09/2019 04:09 AM    Potassium 4.2 08/09/2019 04:09 AM    Chloride 109 (H) 08/09/2019 04:09 AM    CO2 24 08/09/2019 04:09 AM    Glucose 137 (H) 08/09/2019 04:09 AM    BUN 19 08/09/2019 04:09 AM    Creatinine 1.27 08/09/2019 04:09 AM    BUN/Creatinine ratio 15 08/09/2019 04:09 AM    GFR est AA >60 08/09/2019 04:09 AM    GFR est non-AA >60 08/09/2019 04:09 AM    Calcium 9.2 08/09/2019 04:09 AM    Anion gap 8 08/09/2019 04:09 AM    Bilirubin, total 0.6 08/08/2019 02:29 PM    ALT (SGPT) 55 08/08/2019 02:29 PM    AST (SGOT) 27 08/08/2019 02:29 PM    Alk. phosphatase 75 08/08/2019 02:29 PM    Protein, total 8.1 08/08/2019 02:29 PM    Albumin 3.4 (L) 08/08/2019 02:29 PM    Globulin 4.7 (H) 08/08/2019 02:29 PM    A-G Ratio 0.7 (L) 08/08/2019 02:29 PM     Lab Results   Component Value Date/Time    Hemoglobin A1c 5.0 04/04/2019 11:11 AM        Assessment:     Assessment:      Diagnoses and all orders for this visit:    1. Mitral valve insufficiency, unspecified etiology  -     ECHO ADULT COMPLETE; Future  -     ANKLE BRACHIAL INDEX; Future    2. Essential hypertension  -     AMB POC EKG ROUTINE W/ 12 LEADS, INTER & REP  -     ECHO ADULT COMPLETE; Future  -     ANKLE BRACHIAL INDEX; Future    3. History of pulmonary embolism  -     ECHO ADULT COMPLETE; Future  -     ANKLE BRACHIAL INDEX; Future    4. Claudication of lower extremity ([facility]  -     ECHO ADULT COMPLETE; Future  -     ANKLE BRACHIAL INDEX; Future    5. Tobacco abuse disorder  -     ECHO ADULT COMPLETE; Future  -     ANKLE BRACHIAL INDEX; Future    6. BMI 40.0-44.9, adult ([facility]  -     ECHO ADULT COMPLETE; Future  -     ANKLE BRACHIAL INDEX; Future      Specialty Problems        Cardiology Problems    HTN (hypertension)        Obesity, morbid ([facility]        Acute pulmonary embolism ([facility]              ICD-10-CM ICD-9-CM    1. Mitral valve insufficiency, unspecified etiology I34.0 424.0 ECHO ADULT COMPLETE      ANKLE BRACHIAL INDEX   2. Essential hypertension I10 401.9 AMB POC EKG ROUTINE W/ 12 LEADS, INTER & REP      ECHO ADULT COMPLETE      ANKLE BRACHIAL INDEX   3. History of pulmonary embolism Z86.711 V12.55 ECHO ADULT COMPLETE      ANKLE BRACHIAL INDEX   4. Claudication of lower extremity (HCC) I73.9 443.9 ECHO ADULT COMPLETE      ANKLE BRACHIAL INDEX   5. Tobacco abuse disorder Z72.0 305.1 ECHO ADULT COMPLETE      ANKLE BRACHIAL INDEX   6. BMI 40.0-44.9, adult ([facility] Z68.41 V85.41 ECHO ADULT COMPLETE      ANKLE BRACHIAL INDEX         PLAN:  1. Mitral valve insufficiency, unspecified etiology  Mild Mitral regurgitation noted on echo 8/19. Repeat echo. 2. Essential hypertension  Well controlled. Continue current tx    3. History of pulmonary embolism  On Eliquis, echo to evaluate for pulm htn. F/U with Hem/Onc for further recommendations    4. Claudication of lower extremity (HCC)  Evaluate with JAYLEN    5. Tobacco abuse disorder  Recommend smoking cessation, increasing his risk for PE and heart disease    6. BMI 40.0-44.9, adult ([facility]  Diet recommendations:  Decrease carbohydrates (white foods including flour, white bread, white rice, white pasta, white potatoes; corn, sweet foods, sweet drinks and alcohol), increase green leafy vegetables and protein with each meal.  Avoid fried foods.  Eat 3-5 small meals daily.  Do not skip meals.  Ok to use meal replacements up to twice daily with protein goal of 60 mg per meal. Increase water intake. Increase physical activity to 30 minutes daily for health benefit or 60 minutes daily to prevent weight regain, as tolerated.     Physical Activity: A goal of 30 minutes physical activity daily is recommended for health benefit and at least 60 minutes daily to prevent weight regain. For weight loss, no less than 75% needs to be aerobic (i.e. Walking) and no more than 25% resistance exercising (i.e. Weight lifting). Marimar Middleton NP       [facility]    3/18/2020         Patient seen, examined by me personally. Plan discussed as detailed. Agree with note as outlined by  NP. I confirm findings in history and physical exam. No additional findings noted. Agree with plan as outlined above.      Roman Phillip MD

## 2020-03-18 NOTE — LETTER
3/18/20 Patient: Yovany Garcia YOB: 1975 Date of Visit: 3/18/2020 Satya Castillo MD 
[address] [facility] [address] [address] VIA In Basket Dear Satya Castillo MD, Thank you for referring Mr. Diego Pinon to [facility] for evaluation. My notes for this consultation are attached. If you have questions, please do not hesitate to call me. I look forward to following your patient along with you. Sincerely, Vanna Low MD

## 2020-03-18 NOTE — PROGRESS NOTES
1. Have you been to the ER, urgent care clinic since your last visit? Hospitalized since your last visit? No    2. Have you seen or consulted any other health care providers outside of the [address] [place] [address] since your last visit? Include any pap smears or colon screening. No     Chief Complaint   Patient presents with   [facility] Establish Care     new patient; referred by PCP for leaky valve     Chest Pain     Pt c/o occasional sharp pain to chest; goes away after 3 minutes. Not new; onset a year ago8  4/10;  Denies pain radiation

## 2020-03-26 ENCOUNTER — TELEPHONE (OUTPATIENT)
Dept: CARDIOLOGY CLINIC | Age: 45
End: 2020-03-26

## 2020-03-26 NOTE — TELEPHONE ENCOUNTER
----- Message from Mele Hollis NP sent at 3/26/2020 10:28 AM EDT -----  Please let the patient know his study performed on his legs shows no blood flow issues to his legs. He should f/u with his PCP regarding other w/u to be done. Called pt,verified pt with two pt identifiers, advised pt his JAYLEN is normal, no blood flow issues to his legs. Advised he can f/u with PCP for further work up. Pt verbalized understanding.

## 2020-03-31 ENCOUNTER — TELEPHONE (OUTPATIENT)
Dept: FAMILY MEDICINE CLINIC | Age: 45
End: 2020-03-31

## 2020-03-31 NOTE — TELEPHONE ENCOUNTER
----- Message from Alfa Zamudio sent at 3/31/2020  4:29 PM EDT -----  Regarding: Dr. Jaclyn Garrett: [phone] (if not patient): Pt  Relationship of caller (if not patient): n/a  Best contact number(s): [phone]  Name of medication and dosage if known: Vitamin D and Antibiotic for ear infection  Is patient out of this medication (yes/no): y  Pharmacy name: [facility] listed in chart? (yes/no): y  Pharmacy phone number: n/a  Date of last visit: 1/15/20  Details to clarify the request: n/a

## 2020-04-01 DIAGNOSIS — H83.03 INFECTION OF THE INNER EAR, BILATERAL: Primary | ICD-10-CM

## 2020-04-01 RX ORDER — OFLOXACIN 3 MG/ML
5 SOLUTION AURICULAR (OTIC) DAILY
Qty: 5 ML | Refills: 0 | Status: SHIPPED | OUTPATIENT
Start: 2020-04-01 | End: 2020-08-06

## 2020-04-03 NOTE — TELEPHONE ENCOUNTER
Patient called, stating he has been having ear pain for 2 days    He is checking on status of rx for the ear pain    Please call pt to let him know if we can send rx or does he need to be seen    [phone]

## 2020-04-06 NOTE — TELEPHONE ENCOUNTER
Called patient started using ear drops antibiotics and suggest him to call the office after using to set up a virtual appt with PCP

## 2020-04-09 ENCOUNTER — DOCUMENTATION ONLY (OUTPATIENT)
Dept: SLEEP MEDICINE | Age: 45
End: 2020-04-09

## 2020-04-09 ENCOUNTER — VIRTUAL VISIT (OUTPATIENT)
Dept: SLEEP MEDICINE | Age: 45
End: 2020-04-09

## 2020-04-09 VITALS
DIASTOLIC BLOOD PRESSURE: 82 MMHG | WEIGHT: 263 LBS | HEART RATE: 87 BPM | HEIGHT: 67 IN | OXYGEN SATURATION: 98 % | SYSTOLIC BLOOD PRESSURE: 130 MMHG | BODY MASS INDEX: 41.28 KG/M2

## 2020-04-09 DIAGNOSIS — Z86.79 H/O: HTN (HYPERTENSION): ICD-10-CM

## 2020-04-09 DIAGNOSIS — G47.33 OSA (OBSTRUCTIVE SLEEP APNEA): Primary | ICD-10-CM

## 2020-04-09 NOTE — PROGRESS NOTES
[address] [facility] [facility]. [place], [address]  Tel.  [phone]  Fax. [address] [facility] [address]  [place], [address] [facility]  Tel.  [phone]  Fax. [phone] [address]  Tel.  [phone]  Fax. [phone]         Subjective: Sanya Wong is a 45 y.o. male who was seen by synchronous (real-time) audio-video technology on 4/9/2020 after verifying identity using drivers license. Virtual visit was started initially using audio-video technology however due to poor voice quality and delay in sound transmission the rest of the visit was carried over the telephone. He complains of snoring associated with periods of not breathing, poor quality sleep and daytime tiredness. Symptoms began 9 years ago, unchanged since that time. He usually can fall asleep in 15 minutes. Family or house members note snoring and periods of not breathing. He denies completely or partially paralyzed while falling asleep or waking up. Sanya Wong does not wake up frequently at night. He is not bothered by waking up too early and left unable to get back to sleep. He actually sleeps about 6 hours at night and wakes up about 1 times during the night. He does not work shifts: Dequan Keller indicates he does get too little sleep at night. His bedtime is 2300. He awakens at 56. He does not take naps. He has the following observed behaviors: Light snoring, Loud snoring, Pauses in breathing, Grinding teeth(waking with a gasp or snort );  . Other remarks:      [place] Sleepiness Score: 1     Allergies   Allergen Reactions    Morphine Nausea and Vomiting         Current Outpatient Medications:     ofloxacin (FLOXIN) 0.3 % otic solution, Administer 5 Drops into each ear daily. , Disp: 5 mL, Rfl: 0    apixaban (ELIQUIS) 5 mg tablet, Take 1 Tab by mouth two (2) times a day., Disp: 180 Tab, Rfl: 3    acetaminophen (TYLENOL) 650 mg TbER, Take 1 Tab by mouth every eight (8) hours as needed for Pain., Disp: 60 Tab, Rfl: 2     He  has a past medical history of Hypertension, Pulmonary embolism ([address] Rotator cuff injury, and Valvular heart disease. He  has a past surgical history that includes hx orthopaedic; hx back surgery (1999); and hx shoulder arthroscopy. He family history includes Asthma in his mother; Diabetes in his paternal grandmother; Hypertension in his father, mother, and paternal uncle. He  reports that he has been smoking. He has been smoking about 0.50 packs per day for the past 0.00 years. He has never used smokeless tobacco. He reports current alcohol use. He reports current drug use. Drug: Marijuana. Review of Systems:  Constitutional:  No significant weight loss or weight gain  Eyes:  No blurred vision  CVS:  No significant chest pain  Pulm:  No significant shortness of breath  GI:  No significant nausea or vomiting  :  No significant nocturia  Musculoskeletal:  No significant joint pain at night  Skin:  No significant rashes  Neuro:  No significant dizziness   Psych:  No active mood issues    Sleep Review of Systems: notable for no difficulty falling asleep; infrequent awakenings at night;  regular dreaming noted; no nightmares ; no early morning headaches; no memory problems; no concentration issues; no history of any automobile or occupational accidents due to daytime drowsiness. Objective:     Visit Vitals  Ht 5' 7\" (1.702 m)   Wt 263 lb (119.3 kg)   BMI 41.19 kg/m²    Neck circ. in \"inches\": 17.5    Vital Signs, Height and Weight were provided by the patient. Due to this being a [employer] evaluation, many elements of the physical examination are unable to be assessed. General:   Alert, oriented, not in distress   Respiratory  Appeared to be breathing comfortably   Neuro:  Fluent Speech; No obvious facial asymmetry    Psych:  Normal affect ,  Normal countenance ;           Assessment:       ICD-10-CM ICD-9-CM    1. CLAUDIA (obstructive sleep apnea) G47.33 327.23 SLEEP STUDY UNATTENDED, 4 CHANNEL   2. BMI 40.0-44.9, adult ([facility] Z68.41 V85.41    3. H/O: HTN (hypertension) Z86.79 V12.59          Plan:       * Sleep testing was ordered for initial evaluation. * He was provided information on sleep apnea including coresponding risk factors and the importance of proper treatment. * Treatment options if indicated were reviewed today. Patient agrees to a trial of PAP therapy if indicated. * Counseling was provided regarding proper sleep hygiene, appropriate sleep schedule, need for sleep environment safety and safe driving. * Effect of sleep disturbance on weight was reviewed. We have recommended a dedicated weight loss through appropriate diet and an exercise regiment as significant weight reduction has been shown to reduce severity of obstructive sleep apnea. * Patient agrees to telephone [phone]  follow-up by myself or lead sleep technologist shortly after sleep study to review results and plan final management.     (patient has given permission for a message to be left regarding test results and further management if patient cannot be cannot be reached directly). Thank you for allowing us to participate in your patient's medical care. We'll keep you updated on these investigations. Pursuant to the emergency declaration under the [facility] waiver authority and the [employer] Act, this Virtual  Visit was conducted, with patient's consent, to reduce the patient's risk of exposure to COVID-19 and provide continuity of care for an established patient. Services were provided through a video synchronous discussion virtually to substitute for in-person clinic visit. Carin Yadav MD, FAASM  Electronically signed.  04/09/20

## 2020-04-09 NOTE — PATIENT INSTRUCTIONS
[address] [facility] Brian. 1668 [facility], [address] Tel.  [phone] Fax. 100 [facility] [address] [place], [address] Tel.  [phone] Fax. [phone] [address] [place] Tel.  [phone] Fax. [phone] Sleep Apnea: After Your Visit Your Care Instructions Sleep apnea occurs when you frequently stop breathing for 10 seconds or longer during sleep. It can be mild to severe, based on the number of times per hour that you stop breathing or have slowed breathing. Blocked or narrowed airways in your nose, mouth, or throat can cause sleep apnea. Your airway can become blocked when your throat muscles and tongue relax during sleep. Sleep apnea is common, occurring in 1 out of 20 individuals. Individuals having any of the following characteristics should be evaluated and treated right away due to high risk and detrimental consequences from untreated sleep apnea: 
1. Obesity 2. Congestive Heart failure 3. Atrial Fibrillation 4. Uncontrolled Hypertension 5. Type II Diabetes 6. Night-time Arrhythmias 7. Stroke 8. Pulmonary Hypertension 9. High-risk Driving Populations (pilots, truck drivers, etc.) 10. Patients Considering Weight-loss Surgery How do you know you have sleep apnea? You probably have sleep apnea if you answer 'yes' to 3 or more of the following questions: S - Have you been told that you Snore? T - Are you often Tired during the day? O - Has anyone Observed you stop breathing while sleeping? P- Do you have (or are being treated for) high blood Pressure? B - Are you obese (Body Mass Index > 35)? A - Is your Age 48years old or older? N - Is your Neck size greater than 16 inches? G - Are you male Gender? A sleep physician can prescribe a breathing device that prevents tissues in the throat from blocking your airway.  Or your doctor may recommend using a dental device (oral breathing device) to help keep your airway open. In some cases, surgery may be needed to remove enlarged tissues in the throat. Follow-up care is a key part of your treatment and safety. Be sure to make and go to all appointments, and call your doctor if you are having problems. It's also a good idea to know your test results and keep a list of the medicines you take. How can you care for yourself at home? · Lose weight, if needed. It may reduce the number of times you stop breathing or have slowed breathing. · Go to bed at the same time every night. · Sleep on your side. It may stop mild apnea. If you tend to roll onto your back, sew a pocket in the back of your pajama top. Put a tennis ball into the pocket, and stitch the pocket shut. This will help keep you from sleeping on your back. · Avoid alcohol and medicines such as sleeping pills and sedatives before bed. · Do not smoke. Smoking can make sleep apnea worse. If you need help quitting, talk to your doctor about stop-smoking programs and medicines. These can increase your chances of quitting for good. · Prop up the head of your bed 4 to 6 inches by putting bricks under the legs of the bed. · Treat breathing problems, such as a stuffy nose, caused by a cold or allergies. · Use a continuous positive airway pressure (CPAP) breathing machine if lifestyle changes do not help your apnea and your doctor recommends it. The machine keeps your airway from closing when you sleep. · If CPAP does not help you, ask your doctor whether you should try other breathing machines. A bilevel positive airway pressure machine has two types of air pressureâone for breathing in and one for breathing out. Another device raises or lowers air pressure as needed while you breathe. · If your nose feels dry or bleeds when using one of these machines, talk with your doctor about increasing moisture in the air. A humidifier may help.  
· If your nose is runny or stuffy from using a breathing machine, talk with your doctor about using decongestants or a corticosteroid nasal spray. When should you call for help? Watch closely for changes in your health, and be sure to contact your doctor if: 
· You still have sleep apnea even though you have made lifestyle changes. · You are thinking of trying a device such as CPAP. · You are having problems using a CPAP or similar machine. Where can you learn more? Go to Payment plugin. Enter V012 in the search box to learn more about \"Sleep Apnea: After Your Visit. \"  
© 7934-3958 Healthwise, Incorporated. Care instructions adapted under license by [employer] (which disclaims liability or warranty for this information). This care instruction is for use with your licensed healthcare professional. If you have questions about a medical condition or this instruction, always ask your healthcare professional. Severo Mower any warranty or liability for your use of this information. PROPER SLEEP HYGIENE What to avoid · Do not have drinks with caffeine, such as coffee or black tea, for 8 hours before bed. · Do not smoke or use other types of tobacco near bedtime. Nicotine is a stimulant and can keep you awake. · Avoid drinking alcohol late in the evening, because it can cause you to wake in the middle of the night. · Do not eat a big meal close to bedtime. If you are hungry, eat a light snack. · Do not drink a lot of water close to bedtime, because the need to urinate may wake you up during the night. · Do not read or watch TV in bed. Use the bed only for sleeping and sexual activity. What to try · Go to bed at the same time every night, and wake up at the same time every morning. Do not take naps during the day. · Keep your bedroom quiet, dark, and cool. · Get regular exercise, but not within 3 to 4 hours of your bedtime. Gabriel Snare · Sleep on a comfortable pillow and mattress. · If watching the clock makes you anxious, turn it facing away from you so you cannot see the time. · If you worry when you lie down, start a worry book. Well before bedtime, write down your worries, and then set the book and your concerns aside. · Try meditation or other relaxation techniques before you go to bed. · If you cannot fall asleep, get up and go to another room until you feel sleepy. Do something relaxing. Repeat your bedtime routine before you go to bed again. · Make your house quiet and calm about an hour before bedtime. Turn down the lights, turn off the TV, log off the computer, and turn down the volume on music. This can help you relax after a busy day. Drowsy Driving The [employer] cites drowsiness as a causing factor in more than 179,743 police reported crashes annually, resulting in 76,000 injuries and 1,500 deaths. Other surveys suggest 55% of people polled have driven while drowsy in the past year, 23% had fallen asleep but not crashed, 3% crashed, and 2% had and accident due to drowsy driving. Who is at risk? Young Drivers: One study of drowsy driving accidents states that 55% of the drivers were under 25 years. Of those, 75% were male. Shift Workers and Travelers: People who work overnight or travel across time zones frequently are at higher risk of experiencing Circadian Rhythm Disorders. They are trying to work and function when their body is programed to sleep. Sleep Deprived: Lack of sleep has a serious impact on your ability to pay attention or focus on a task. Consistently getting less than the average of 8 hours your body needs creates partial or cumulative sleep deprivation. Untreated Sleep Disorders: Sleep Apnea, Narcolepsy, R.L.S., and other sleep disorders (untreated) prevent a person from getting enough restful sleep.  This leads to excessive daytime sleepiness and increases the risk for drowsy driving accidents by up to 7 times. Medications / Alcohol: Even over the counter medications can cause drowsiness. Medications that impair a drivers attention should have a warning label. Alcohol naturally makes you sleepy and on its own can cause accidents. Combined with excessive drowsiness its effects are amplified. Signs of Drowsy Driving: * You don't remember driving the last few miles * You may drift out of your mateo * You are unable to focus and your thoughts wander * You may yawn more often than normal 
 * You have difficulty keeping your eyes open / nodding off * Missing traffic signs, speeding, or tailgating Prevention-  
Good sleep hygiene, lifestyle and behavioral choices have the most impact on drowsy driving. There is no substitute for sleep and the average person requires 8 hours nightly. If you find yourself driving drowsy, stop and sleep. Consider the sleep hygiene tips provided during your visit as well. Medication Refill Policy: Refills for all medications require 1 week advance notice. Please have your pharmacy fax a refill request. We are unable to fax, or call in \"controled substance\" medications and you will need to pick these prescriptions up from our office. [employer] Activation Thank you for requesting access to [employer]. Please follow the instructions below to securely access and download your online medical record. [employer] allows you to send messages to your doctor, view your test results, renew your prescriptions, schedule appointments, and more. How Do I Sign Up? 1. In your internet browser, go to [email or web address]. 2. Click on the First Time User? Click Here link in the Sign In box. You will see the New Member Sign Up page. 3. Enter your [employer] Access Code exactly as it appears below. You will not need to use this code after youve completed the sign-up process.  If you do not sign up before the expiration date, you must request a new code. Selero Access Code: [email or web address] Expires: 2020  9:46 AM (This is the date your Selero access code will ) 4. Enter the last four digits of your Social Security Number (xxxx) and Date of Birth (mm/dd/yyyy) as indicated and click Submit. You will be taken to the next sign-up page. 5. Create a Selero ID. This will be your Selero login ID and cannot be changed, so think of one that is secure and easy to remember. 6. Create a Selero password. You can change your password at any time. 7. Enter your Password Reset Question and Answer. This can be used at a later time if you forget your password. 8. Enter your e-mail address. You will receive e-mail notification when new information is available in [address] 9. Click Sign Up. You can now view and download portions of your medical record. 10. Click the Download Summary menu link to download a portable copy of your medical information. Additional Information If you have questions, please call [phone]. Remember, [employer] is NOT to be used for urgent needs. For medical emergencies, dial 911.

## 2020-04-10 ENCOUNTER — VIRTUAL VISIT (OUTPATIENT)
Dept: FAMILY MEDICINE CLINIC | Age: 45
End: 2020-04-10

## 2020-04-10 VITALS — BODY MASS INDEX: 41.19 KG/M2 | HEIGHT: 67 IN

## 2020-04-10 DIAGNOSIS — I10 HYPERTENSION, WELL CONTROLLED: ICD-10-CM

## 2020-04-10 DIAGNOSIS — G47.33 OSA (OBSTRUCTIVE SLEEP APNEA): Primary | ICD-10-CM

## 2020-04-10 DIAGNOSIS — E66.01 MORBID OBESITY WITH BMI OF 40.0-44.9, ADULT (HCC): ICD-10-CM

## 2020-04-10 NOTE — PROGRESS NOTES
Chief Complaint   Patient presents with    Follow-up     3 MONTH      Patient had an ear infection, state doing better.

## 2020-04-10 NOTE — PROGRESS NOTES
Chief Complaint   Patient presents with    Follow-up     3 MONTH      HPI:  Ruben Escobar is a 45 y.o. AA male with pulmonary embolism,  hypertension,  morbid obesity,  Tobacco abuse  CLAUDIA evaluated via telephone on 4/10/2020. Consent:  He is aware that he may receive a bill for this telephone service, depending on his insurance coverage, and has provided verbal consent to proceed: Yes    Documentation:  I communicated with the patient about stopping smoking,CLAUDIA follow up for sleep studies. Patient is encouraged to continue medications, diet and exercise. I affirm this is a Patient Initiated Episode with an Established Patient who has not had a related appointment within my department in the past 7 days or scheduled within the next 24 hours.     Total Time: minutes: 11-20 minutes    Note: not billable if this call serves to triage the patient into an appointment for the relevant concern      Stan Daniels MD

## 2020-04-15 ENCOUNTER — HOSPITAL ENCOUNTER (OUTPATIENT)
Dept: SLEEP MEDICINE | Age: 45
Discharge: HOME | End: 2020-04-15
Payer: COMMERCIAL

## 2020-04-15 PROCEDURE — 95806 SLEEP STUDY UNATT&RESP EFFT: CPT | Performed by: INTERNAL MEDICINE

## 2020-04-16 ENCOUNTER — TELEPHONE (OUTPATIENT)
Dept: SLEEP MEDICINE | Age: 45
End: 2020-04-16

## 2020-04-16 ENCOUNTER — DOCUMENTATION ONLY (OUTPATIENT)
Dept: SLEEP MEDICINE | Age: 45
End: 2020-04-16

## 2020-05-28 ENCOUNTER — TELEPHONE (OUTPATIENT)
Dept: SLEEP MEDICINE | Age: 45
End: 2020-05-28

## 2020-05-28 DIAGNOSIS — G47.33 OSA (OBSTRUCTIVE SLEEP APNEA): Primary | ICD-10-CM

## 2020-05-28 NOTE — TELEPHONE ENCOUNTER
Data collected during the day without pulse oximetry, it is unclear whether this was an actual nocturnal test. A previous test from the same date was interpreted on 04-16-20. Failed HSAT - PSG to be scheduled when laboratory re-opens.     Orders Placed This Encounter    POLYSOMNOGRAPHY 1 NIGHT     Standing Status:   Future     Standing Expiration Date:   11/28/2020     Order Specific Question:   Reason for Exam     Answer:   CLAUDIA

## 2020-06-01 NOTE — TELEPHONE ENCOUNTER
Left message with patient to inform him of results of second failed Home Sleep Apnea Test and to schedule sleep study. Left detailed message with reason and asked him to return our call to schedule.

## 2020-08-06 ENCOUNTER — VIRTUAL VISIT (OUTPATIENT)
Dept: FAMILY MEDICINE CLINIC | Age: 45
End: 2020-08-06
Payer: COMMERCIAL

## 2020-08-06 DIAGNOSIS — I26.99 PULMONARY EMBOLISM WITHOUT ACUTE COR PULMONALE, UNSPECIFIED CHRONICITY, UNSPECIFIED PULMONARY EMBOLISM TYPE (HCC): Primary | ICD-10-CM

## 2020-08-06 DIAGNOSIS — R07.89 CHEST DISCOMFORT: ICD-10-CM

## 2020-08-06 PROCEDURE — 99213 OFFICE O/P EST LOW 20 MIN: CPT | Performed by: INTERNAL MEDICINE

## 2020-08-06 NOTE — PROGRESS NOTES
Name and  verified      Chief Complaint   Patient presents with    Hypertension     follow up    Back Pain     for one month         Patient stated would like second opinion on X-rays of lungs due history of blood clots. Patient stated please call at [phone] unable to connect to [employer] due to telephoned issues.

## 2020-08-06 NOTE — PROGRESS NOTES
Chief Complaint   Patient presents with    Hypertension     follow up    Back Pain     for one month     HPI:  Erminia Collet is a 45 y.o. male, evaluated via audio-only technology on 8/6/2020 for Hypertension (follow up) and Back Pain (for one month)    Assessment & Plan:   Diagnoses and all orders for this visit:    1. Pulmonary embolism without acute cor pulmonale, unspecified chronicity, unspecified pulmonary embolism type (HCC)  -     CTA CHEST W OR W WO CONT; Future    2. Chest discomfort  -     CTA CHEST W OR W WO CONT; Future    12  Subjective: Erminia Collet is a 45 y.o. AA male with h/o hypertension, Morbid obesity, tobacco abuse disorder, pulmonary embolism 8/8/19 who is seen for follow up. Patient is complaining of chest discomfort similar that experienced when he developed PE. Denies fever/chills, cough. Prior to Admission medications    Medication Sig Start Date End Date Taking? Authorizing Provider   apixaban (ELIQUIS) 5 mg tablet Take 1 Tab by mouth two (2) times a day. 1/15/20  Yes Diane Yip MD   acetaminophen (TYLENOL) 650 mg TbER Take 1 Tab by mouth every eight (8) hours as needed for Pain. 1/15/20  Yes Diane Yip MD   ofloxacin (FLOXIN) 0.3 % otic solution Administer 5 Drops into each ear daily. 4/1/20 8/6/20  Diane Yip MD     Patient Active Problem List   Diagnosis Code    HTN (hypertension) I10    Obesity, morbid ([facility] E66.01    Acute pulmonary embolism (HCC) I26.99    Tobacco abuse disorder Z72.0     Current Outpatient Medications   Medication Sig Dispense Refill    apixaban (ELIQUIS) 5 mg tablet Take 1 Tab by mouth two (2) times a day. 180 Tab 3    acetaminophen (TYLENOL) 650 mg TbER Take 1 Tab by mouth every eight (8) hours as needed for Pain.  60 Tab 2     Allergies   Allergen Reactions    Morphine Nausea and Vomiting     Past Medical History:   Diagnosis Date    Hypertension     Pulmonary embolism (HCC)     Rotator cuff injury     Valvular heart disease     trace mitral valve regurg 8/19     Past Surgical History:   Procedure Laterality Date    HX BACK SURGERY  1999    HX ORTHOPAEDIC      finger     HX SHOULDER ARTHROSCOPY       Family History   Problem Relation Age of Onset    Asthma Mother     Hypertension Mother     Hypertension Father     Hypertension Paternal Uncle     Diabetes Paternal Grandmother      Social History     Tobacco Use    Smoking status: Current Every Day Smoker     Packs/day: 0.50     Years: 0.00     Pack years: 0.00    Smokeless tobacco: Never Used   Substance Use Topics    Alcohol use: Yes     Comment: occasionally     ROS  As per hpi    Patient-Reported Vitals 8/6/2020   Patient-Reported Weight 266.0 lbs   Patient-Reported Height 5'7\"      Lizz Lowe, who was evaluated through a patient-initiated, synchronous (real-time) audio only encounter, and/or her healthcare decision maker, is aware that it is a billable service, with coverage as determined by his insurance carrier. He provided verbal consent to proceed: Yes. He has not had a related appointment within my department in the past 7 days or scheduled within the next 24 hours.     Total Time: minutes: 11-20 minutes    Vibha Huggins MD

## 2020-08-17 ENCOUNTER — HOSPITAL ENCOUNTER (OUTPATIENT)
Dept: CT IMAGING | Age: 45
Discharge: HOME | End: 2020-08-17
Attending: INTERNAL MEDICINE
Payer: COMMERCIAL

## 2020-08-17 DIAGNOSIS — R07.89 CHEST DISCOMFORT: ICD-10-CM

## 2020-08-17 DIAGNOSIS — I26.99 PULMONARY EMBOLISM WITHOUT ACUTE COR PULMONALE, UNSPECIFIED CHRONICITY, UNSPECIFIED PULMONARY EMBOLISM TYPE (HCC): ICD-10-CM

## 2020-08-17 PROCEDURE — 71275 CT ANGIOGRAPHY CHEST: CPT

## 2020-08-17 PROCEDURE — 74011636320 HC RX REV CODE- 636/320: Performed by: INTERNAL MEDICINE

## 2020-08-17 RX ORDER — SODIUM CHLORIDE 0.9 % (FLUSH) 0.9 %
10 SYRINGE (ML) INJECTION
Status: COMPLETED | OUTPATIENT
Start: 2020-08-17 | End: 2020-08-17

## 2020-08-17 RX ADMIN — IOPAMIDOL 80 ML: 755 INJECTION, SOLUTION INTRAVENOUS at 14:32

## 2020-08-17 RX ADMIN — Medication 10 ML: at 14:32

## 2020-08-22 RX ORDER — MELATONIN
1000 DAILY
Qty: 30 TAB | Refills: 3 | Status: SHIPPED | OUTPATIENT
Start: 2020-08-22 | End: 2020-10-27 | Stop reason: SDUPTHER

## 2020-10-27 ENCOUNTER — VIRTUAL VISIT (OUTPATIENT)
Dept: FAMILY MEDICINE CLINIC | Age: 45
End: 2020-10-27
Payer: COMMERCIAL

## 2020-10-27 DIAGNOSIS — I10 HYPERTENSION, WELL CONTROLLED: ICD-10-CM

## 2020-10-27 DIAGNOSIS — E78.00 HYPERCHOLESTEROLEMIA: ICD-10-CM

## 2020-10-27 DIAGNOSIS — R35.0 FREQUENCY OF URINATION: ICD-10-CM

## 2020-10-27 DIAGNOSIS — R73.02 IGT (IMPAIRED GLUCOSE TOLERANCE): ICD-10-CM

## 2020-10-27 DIAGNOSIS — M25.511 CHRONIC RIGHT SHOULDER PAIN: ICD-10-CM

## 2020-10-27 DIAGNOSIS — E55.9 VITAMIN D DEFICIENCY: ICD-10-CM

## 2020-10-27 DIAGNOSIS — Z00.00 ENCOUNTER FOR ANNUAL PHYSICAL EXAM: Primary | ICD-10-CM

## 2020-10-27 DIAGNOSIS — M17.0 PRIMARY OSTEOARTHRITIS OF BOTH KNEES: ICD-10-CM

## 2020-10-27 DIAGNOSIS — Z13.29 SCREENING FOR THYROID DISORDER: ICD-10-CM

## 2020-10-27 DIAGNOSIS — E66.01 MORBID OBESITY WITH BMI OF 40.0-44.9, ADULT (HCC): ICD-10-CM

## 2020-10-27 DIAGNOSIS — G89.29 CHRONIC RIGHT SHOULDER PAIN: ICD-10-CM

## 2020-10-27 DIAGNOSIS — I26.99 OTHER ACUTE PULMONARY EMBOLISM WITHOUT ACUTE COR PULMONALE (HCC): ICD-10-CM

## 2020-10-27 PROCEDURE — 99214 OFFICE O/P EST MOD 30 MIN: CPT | Performed by: INTERNAL MEDICINE

## 2020-10-27 PROCEDURE — 99396 PREV VISIT EST AGE 40-64: CPT | Performed by: INTERNAL MEDICINE

## 2020-10-27 RX ORDER — MELATONIN
1000 DAILY
Qty: 30 TAB | Refills: 3 | Status: SHIPPED | OUTPATIENT
Start: 2020-10-27 | End: 2022-10-20 | Stop reason: SDUPTHER

## 2020-10-27 RX ORDER — DEXTROMETHORPHAN HYDROBROMIDE, GUAIFENESIN 5; 100 MG/5ML; MG/5ML
650 LIQUID ORAL
Qty: 60 TAB | Refills: 2 | Status: SHIPPED | OUTPATIENT
Start: 2020-10-27 | End: 2022-10-26 | Stop reason: ALTCHOICE

## 2020-10-27 RX ORDER — NAPROXEN 500 MG/1
500 TABLET ORAL 2 TIMES DAILY WITH MEALS
Qty: 60 TAB | Refills: 0 | Status: SHIPPED | OUTPATIENT
Start: 2020-10-27 | End: 2022-10-20 | Stop reason: SDUPTHER

## 2020-10-27 RX ORDER — PREDNISONE 10 MG/1
TABLET ORAL
Qty: 21 TAB | Refills: 0 | Status: SHIPPED | OUTPATIENT
Start: 2020-10-27 | End: 2022-10-20 | Stop reason: ALTCHOICE

## 2020-10-27 RX ORDER — METHYLPREDNISOLONE 4 MG/1
TABLET ORAL
COMMUNITY
Start: 2020-10-05 | End: 2020-10-27

## 2020-10-27 NOTE — PROGRESS NOTES
Chief Complaint   Patient presents with    Shoulder Pain     right upper shoulder between neck and shoulder       HPI:  Layla Gonzalez is a 45 y.o. male who was seen by synchronous (real-time) audio-video technology on 10/27/2020 for Shoulder Pain (right upper shoulder between neck and shoulder  )    Assessment & Plan:   Diagnoses and all orders for this visit:    1. Encounter for annual physical exam  -     METABOLIC PANEL, COMPREHENSIVE  -     CBC W/O DIFF    2. Other acute pulmonary embolism without acute cor pulmonale (HCC)  -     apixaban (ELIQUIS) 5 mg tablet; Take 1 Tab by mouth two (2) times a day. 3. Primary osteoarthritis of both knees  -     acetaminophen (TYLENOL) 650 mg TbER; Take 1 Tab by mouth every eight (8) hours as needed for Pain. -     predniSONE (STERAPRED DS) 10 mg dose pack; See administration instruction per 10mg dose pack  -     naproxen (NAPROSYN) 500 mg tablet; Take 1 Tab by mouth two (2) times daily (with meals). 4. Vitamin D deficiency  -     cholecalciferol (VITAMIN D3) (1000 Units /25 mcg) tablet; Take 1 Tab by mouth daily. -     VITAMIN D, 25 HYDROXY    5. Chronic right shoulder pain  -     XR SHOULDER RT AP/LAT MIN 2 V; Future    6. Hypertension, well controlled  -     METABOLIC PANEL, COMPREHENSIVE    7. Morbid obesity with BMI of 40.0-44.9, adult ([facility])  -     LIPID PANEL    8. Hypercholesterolemia  -     LIPID PANEL    9. Screening for thyroid disorder  -     TSH 3RD GENERATION    10. IGT (impaired glucose tolerance)  -     HEMOGLOBIN A1C WITH EAG    11. Frequency of urination  -     URINALYSIS W/ RFLX MICROSCOPIC      I spent at least 20 minutes on this visit with this established patient. 712  Subjective: Layla Gonzalez is a 45 y.o. AA male with h/o hypertension, Morbid obesity, tobacco abuse disorder, pulmonary embolism 8/8/19 who is seen for follow up. Patient has c/o right shoulder pain associated with numbness and tingling of fingers.  This is chronic issue but the symptoms are worsening. Also he needs refill on all medications. Also, patient is overdue for annual blood work. Prior to Admission medications    Medication Sig Start Date End Date Taking? Authorizing Provider   cholecalciferol (VITAMIN D3) (1000 Units /25 mcg) tablet Take 1 Tab by mouth daily. 8/22/20  Yes Abdirashid Hu MD   apixaban (ELIQUIS) 5 mg tablet Take 1 Tab by mouth two (2) times a day. 1/15/20  Yes Abdirashid Hu MD   acetaminophen (TYLENOL) 650 mg TbER Take 1 Tab by mouth every eight (8) hours as needed for Pain. 1/15/20  Yes Abdirashid Hu MD   methylPREDNISolone (MEDROL DOSEPACK) 4 mg tablet TAKE BY MOUTH AS DIRECTED ON INSIDE OF PACKAGE 10/5/20 10/27/20  Provider, Historical     Patient Active Problem List   Diagnosis Code    HTN (hypertension) I10    Obesity, morbid ([facility] [address] E66.01    Acute pulmonary embolism (HCC) I26.99    Tobacco abuse disorder Z72.0     Current Outpatient Medications   Medication Sig Dispense Refill    cholecalciferol (VITAMIN D3) (1000 Units /25 mcg) tablet Take 1 Tab by mouth daily. 30 Tab 3    apixaban (ELIQUIS) 5 mg tablet Take 1 Tab by mouth two (2) times a day. 180 Tab 3    acetaminophen (TYLENOL) 650 mg TbER Take 1 Tab by mouth every eight (8) hours as needed for Pain.  60 Tab 2     Allergies   Allergen Reactions    Morphine Nausea and Vomiting     Past Medical History:   Diagnosis Date    Hypertension     Pulmonary embolism (HCC)     Rotator cuff injury     Valvular heart disease     trace mitral valve regurg 8/19     Past Surgical History:   Procedure Laterality Date    HX BACK SURGERY  1999    HX ORTHOPAEDIC      finger     HX SHOULDER ARTHROSCOPY       Family History   Problem Relation Age of Onset    Asthma Mother     Hypertension Mother     Hypertension Father     Hypertension Paternal Uncle     Diabetes Paternal Grandmother      Social History     Tobacco Use    Smoking status: Current Every Day Smoker     Packs/day: 0.50     Years: 0.00     Pack years: 0.00    Smokeless tobacco: Never Used   Substance Use Topics    Alcohol use: Yes     Comment: occasionally     ROS  As per hpi    Objective:     Patient-Reported Vitals 8/6/2020   Patient-Reported Weight 266.0 lbs   Patient-Reported Height 5'7\"      General: alert, cooperative, no distress   Mental  status: normal mood, behavior, speech, dress, motor activity, and thought processes, able to follow commands   HENT: NCAT   Neck: no visualized mass   Resp: no respiratory distress   Neuro: no gross deficits   Skin: no discoloration or lesions of concern on visible areas     Additional exam findings: We discussed the expected course, resolution and complications of the diagnosis(es) in detail. Medication risks, benefits, costs, interactions, and alternatives were discussed as indicated. I advised him to contact the office if his condition worsens, changes or fails to improve as anticipated. He expressed understanding with the diagnosis(es) and plan. Chad Aldrich, who was evaluated through a patient-initiated, synchronous (real-time) audio-video encounter, and/or his healthcare decision maker, is aware that it is a billable service, with coverage as determined by his insurance carrier. He provided verbal consent to proceed: Yes, and patient identification was verified. It was conducted pursuant to the emergency declaration under the [address] [place] [address] authority and the [employer] General Act. A caregiver was present when appropriate. Ability to conduct physical exam was limited. I was in the office. The patient was at home.       Gilma Garza MD

## 2020-10-27 NOTE — LETTER
10/27/2020 3:46 PM 
 
Mr. Malvin Boeck [address] [facility] [id number] After Visit Summary Malvin Boeck  : 1975 CSN: [id number]  
 10/27/2020 10:30 [address] [facility] [phone] Today's Visit You saw Miesha Evangelista MD on 2020. The following issues were addressed:  
0 Other acute pulmonary embolism without acute cor pulmonale (HCC) 0 Primary osteoarthritis of both knees 0 Vitamin D deficiency 0 Chronic right shoulder pain  
0 Hypertension, well controlled  
0 Morbid obesity with BMI of 40.0-44.9, adult ([facility] [address] 0 Hypercholesterolemia  
0 Encounter for annual physical exam  
0 Screening for thyroid disorder  
0 IGT (impaired glucose tolerance) 0 Frequency of urination Done Today METABOLIC PANEL, COMPREHENSIVE for Hypertension, well controlled; Encounter for annual physical exam  
 CBC W/O DIFF for Encounter for annual physical exam  
 LIPID PANEL for Morbid obesity with BMI of 40.0-44.9, adult ([facility] [address] Hypercholesterolemia HEMOGLOBIN A1C WITH EAG for IGT (impaired glucose tolerance) TSH 3RD GENERATION for Screening for thyroid disorder VITAMIN D, 25 HYDROXY for Vitamin D deficiency URINALYSIS W/ RFLX MICROSCOPIC for Frequency of urination After Visit Summary Instructions  
from Miesha Evangelista MD  
  
Today's medication changes START taking:  
 naproxen (NAPROSYN)   
 predniSONE (STERAPRED DS)  Accurate as of 2020  3:45 PM. Review your updated medication list below.  these medications at [address]  
 
 acetaminophen  apixaban  cholecalciferol  naproxen  predniSONE Address:  [facility] Phone:  [phone] XR SHOULDER RT AP/LAT MIN 2 V Expires: 10/27/2021 (requested) Return 2 weeks in office, for  follow up on results and treatment. Today's Visit Done Today What's Next You currently have no upcoming appointments scheduled. Reason for Visit Shoulder Pain  right upper shoulder between neck and shoulder     
Current Immunizations Never Reviewed Name  Date Tdap  4/4/2019  2:30 PM   
Not reviewed this visit Upcoming Health Maintenance     
Full History Flu Vaccine (1)   Postponed until 6/30/2021 Pneumococcal 0-64 years (1 of 1 - PPSV23)   Postponed until 10/27/2021 Lipid Screen (Every 5 Years)   Order placed this encounter DTaP/Tdap/Td series (2 - Td)   Next due on 4/4/2029 Referral Information Referral ID  Referred By  Referred To    
[id number]  [facility] SAMAN Pickens.Maura ONTIVEROS  Not Available Visits  Status  Start Date  End Date 1  New Request  10/27/20  10/27/21 If your referral has a status of pending review or denied, additional information will be sent to support the outcome of this decision. Depression Screening Your response on the Depression Screen - Patient Health Questionnaire (PHQ) is normal. No further evaluation is required other than repeat screening in one year or as needed. 
  
  
Additional Information about your Body Mass Index (BMI) Your BMI as listed above is considered obese (30 or more). BMI is an estimate of body fat, calculated from your height and weight. The higher your BMI, the greater your risk of heart disease, high blood pressure, type 2 diabetes, stroke, gallstones, arthritis, sleep apnea, and certain cancers. BMI is not perfect. It may overestimate body fat in athletes and people who are more muscular.   Even a small weight loss (between 5 and 10 percent of your current weight) by decreasing your calorie intake and becoming more physically active will help lower your risk of developing or worsening diseases associated with obesity.  
  
Learn more at: REQQI 
  
  
  
  
  
Sherwin Gonzalez introduces [employer] patient portal. Now you can access parts of your medical record, email your doctor's office, and request medication refills online.   
  
1. In your internet browser, go to [email or web address] 2. Click on the First Time User? Click Here link in the Sign In box. You will see the New Member Sign Up page. 3. Enter your [employer] Access Code exactly as it appears below. You will not need to use this code after youve completed the sign-up process. If you do not sign up before the expiration date, you must request a new code. 
  
· [employer] Access Code: [email or web address] · Expires: 12/11/2020  1:48 PM 
  
4. Enter the last four digits of your Social Security Number (xxxx) and Date of Birth (mm/dd/yyyy) as indicated and click Submit. You will be taken to the next sign-up page. 5. Create a [employer] ID. This will be your [employer] login ID and cannot be changed, so think of one that is secure and easy to remember. 6. Create a [employer] password. You can change your password at any time. 7. Enter your Password Reset Question and Answer. This can be used at a later time if you forget your password. 8. Enter your e-mail address. You will receive e-mail notification when new information is available in [address] 9. Click Sign Up. You can now view and download portions of your medical record. 10. Click the Download Summary menu link to download a portable copy of your medical information. 
  
If you have questions, please visit the Frequently Asked Questions section of the [employer] website. Remember, [employer] is NOT to be used for urgent needs. For medical emergencies, dial 911. 
  
  
Now available from your iPhone and Android! 
  
Changes to Your Medication List  
 
(suggestion)   Accurate as of October 27, 2020  3:45 PM. If you have any questions, ask your nurse or doctor. START taking these medications START taking these medications  
 naproxen 500 mg tablet Commonly known as: NAPROSYN  
 Started by: Faiza Sparks MD  Take 1 Tab by mouth two (2) times daily (with meals). predniSONE 10 mg dose pack Commonly known as: STERAPRED DS Started by: Faiza Sparks MD  See administration instruction per 10mg dose pack CONTINUE taking these medications CONTINUE taking these medications  
 acetaminophen 650 mg Tber Commonly known as: TYLENOL  Take 1 Tab by mouth every eight (8) hours as needed for Pain. apixaban 5 mg tablet Commonly known as: ELIQUIS  Take 1 Tab by mouth two (2) times a day. cholecalciferol (1000 Units /25 mcg) tablet Commonly known as: VITAMIN D3  Take 1 Tab by mouth daily. Sincerely, Faiza Sparks MD

## 2020-10-28 ENCOUNTER — HOSPITAL ENCOUNTER (OUTPATIENT)
Dept: GENERAL RADIOLOGY | Age: 45
Discharge: HOME | End: 2020-10-28
Payer: COMMERCIAL

## 2020-10-28 DIAGNOSIS — G89.29 CHRONIC RIGHT SHOULDER PAIN: ICD-10-CM

## 2020-10-28 DIAGNOSIS — M25.511 CHRONIC RIGHT SHOULDER PAIN: ICD-10-CM

## 2020-10-28 PROCEDURE — 73030 X-RAY EXAM OF SHOULDER: CPT

## 2020-10-30 LAB
25(OH)D3+25(OH)D2 SERPL-MCNC: 37.7 NG/ML (ref 30–100)
ALBUMIN SERPL-MCNC: 4.8 G/DL (ref 4–5)
ALBUMIN/GLOB SERPL: 1.8 {RATIO} (ref 1.2–2.2)
ALP SERPL-CCNC: 44 IU/L (ref 39–117)
ALT SERPL-CCNC: 35 IU/L (ref 0–44)
APPEARANCE UR: CLEAR
AST SERPL-CCNC: 96 IU/L (ref 0–40)
BILIRUB SERPL-MCNC: 0.6 MG/DL (ref 0–1.2)
BILIRUB UR QL STRIP: NEGATIVE
BUN SERPL-MCNC: 11 MG/DL (ref 6–24)
BUN/CREAT SERPL: 9 (ref 9–20)
CALCIUM SERPL-MCNC: 9.8 MG/DL (ref 8.7–10.2)
CHLORIDE SERPL-SCNC: 103 MMOL/L (ref 96–106)
CHOLEST SERPL-MCNC: 219 MG/DL (ref 100–199)
CO2 SERPL-SCNC: 24 MMOL/L (ref 20–29)
COLOR UR: YELLOW
CREAT SERPL-MCNC: 1.24 MG/DL (ref 0.76–1.27)
ERYTHROCYTE [DISTWIDTH] IN BLOOD BY AUTOMATED COUNT: 12.8 % (ref 11.6–15.4)
EST. AVERAGE GLUCOSE BLD GHB EST-MCNC: 94 MG/DL
GLOBULIN SER CALC-MCNC: 2.6 G/DL (ref 1.5–4.5)
GLUCOSE SERPL-MCNC: 92 MG/DL (ref 65–99)
GLUCOSE UR QL: NEGATIVE
HBA1C MFR BLD: 4.9 % (ref 4.8–5.6)
HCT VFR BLD AUTO: 39.9 % (ref 37.5–51)
HDLC SERPL-MCNC: 65 MG/DL
HGB BLD-MCNC: 14.2 G/DL (ref 13–17.7)
HGB UR QL STRIP: NEGATIVE
KETONES UR QL STRIP: NEGATIVE
LDLC SERPL CALC-MCNC: 138 MG/DL (ref 0–99)
LEUKOCYTE ESTERASE UR QL STRIP: NEGATIVE
MCH RBC QN AUTO: 31.1 PG (ref 26.6–33)
MCHC RBC AUTO-ENTMCNC: 35.6 G/DL (ref 31.5–35.7)
MCV RBC AUTO: 87 FL (ref 79–97)
MICRO URNS: NORMAL
NITRITE UR QL STRIP: NEGATIVE
PH UR STRIP: 5.5 [PH] (ref 5–7.5)
PLATELET # BLD AUTO: 160 X10E3/UL (ref 150–450)
POTASSIUM SERPL-SCNC: 4.4 MMOL/L (ref 3.5–5.2)
PROT SERPL-MCNC: 7.4 G/DL (ref 6–8.5)
PROT UR QL STRIP: NEGATIVE
RBC # BLD AUTO: 4.57 X10E6/UL (ref 4.14–5.8)
SODIUM SERPL-SCNC: 138 MMOL/L (ref 134–144)
SP GR UR: 1.01 (ref 1–1.03)
TRIGL SERPL-MCNC: 92 MG/DL (ref 0–149)
TSH SERPL DL<=0.005 MIU/L-ACNC: 0.39 UIU/ML (ref 0.45–4.5)
UROBILINOGEN UR STRIP-MCNC: 0.2 MG/DL (ref 0.2–1)
VLDLC SERPL CALC-MCNC: 16 MG/DL (ref 5–40)
WBC # BLD AUTO: 5.9 X10E3/UL (ref 3.4–10.8)

## 2022-03-18 PROBLEM — I26.99 ACUTE PULMONARY EMBOLISM (HCC): Status: ACTIVE | Noted: 2019-08-08

## 2022-03-19 PROBLEM — E66.01 OBESITY, MORBID (HCC): Status: ACTIVE | Noted: 2019-04-04

## 2022-03-20 PROBLEM — Z72.0 TOBACCO ABUSE DISORDER: Status: ACTIVE | Noted: 2019-08-10

## 2022-10-16 VITALS
HEART RATE: 78 BPM | SYSTOLIC BLOOD PRESSURE: 155 MMHG | TEMPERATURE: 97.8 F | HEIGHT: 67 IN | OXYGEN SATURATION: 100 % | RESPIRATION RATE: 18 BRPM | BODY MASS INDEX: 39.41 KG/M2 | WEIGHT: 251.1 LBS | DIASTOLIC BLOOD PRESSURE: 71 MMHG

## 2022-10-16 LAB
APPEARANCE UR: CLEAR
BACTERIA URNS QL MICRO: NEGATIVE /HPF
BILIRUB UR QL: NEGATIVE
COLOR UR: NORMAL
EPITH CASTS URNS QL MICRO: NORMAL /LPF
GLUCOSE UR STRIP.AUTO-MCNC: NEGATIVE MG/DL
HGB UR QL STRIP: NEGATIVE
HYALINE CASTS URNS QL MICRO: NORMAL /LPF (ref 0–2)
KETONES UR QL STRIP.AUTO: NEGATIVE MG/DL
LEUKOCYTE ESTERASE UR QL STRIP.AUTO: NEGATIVE
NITRITE UR QL STRIP.AUTO: NEGATIVE
PH UR STRIP: 7 [PH] (ref 5–8)
PROT UR STRIP-MCNC: NEGATIVE MG/DL
RBC #/AREA URNS HPF: NORMAL /HPF (ref 0–5)
SP GR UR REFRACTOMETRY: 1.01
UA: UC IF INDICATED,UAUC: NORMAL
UROBILINOGEN UR QL STRIP.AUTO: 1 EU/DL (ref 0.2–1)
WBC URNS QL MICRO: NORMAL /HPF (ref 0–4)

## 2022-10-16 PROCEDURE — 99284 EMERGENCY DEPT VISIT MOD MDM: CPT

## 2022-10-16 PROCEDURE — 81001 URINALYSIS AUTO W/SCOPE: CPT

## 2022-10-17 ENCOUNTER — HOSPITAL ENCOUNTER (EMERGENCY)
Age: 47
Discharge: HOME | End: 2022-10-17
Attending: EMERGENCY MEDICINE
Payer: COMMERCIAL

## 2022-10-17 DIAGNOSIS — M54.50 ACUTE BILATERAL LOW BACK PAIN WITHOUT SCIATICA: Primary | ICD-10-CM

## 2022-10-17 PROCEDURE — 96372 THER/PROPH/DIAG INJ SC/IM: CPT

## 2022-10-17 PROCEDURE — 74011250636 HC RX REV CODE- 250/636: Performed by: EMERGENCY MEDICINE

## 2022-10-17 RX ORDER — OXYCODONE AND ACETAMINOPHEN 5; 325 MG/1; MG/1
1 TABLET ORAL
Qty: 12 TABLET | Refills: 0 | Status: SHIPPED | OUTPATIENT
Start: 2022-10-17 | End: 2022-10-20

## 2022-10-17 RX ORDER — KETOROLAC TROMETHAMINE 30 MG/ML
30 INJECTION, SOLUTION INTRAMUSCULAR; INTRAVENOUS
Status: COMPLETED | OUTPATIENT
Start: 2022-10-17 | End: 2022-10-17

## 2022-10-17 RX ORDER — METHOCARBAMOL 750 MG/1
750 TABLET, FILM COATED ORAL
Qty: 15 TABLET | Refills: 0 | Status: SHIPPED | OUTPATIENT
Start: 2022-10-17 | End: 2022-10-26 | Stop reason: SDUPTHER

## 2022-10-17 RX ORDER — PREDNISONE 20 MG/1
60 TABLET ORAL DAILY
Qty: 15 TABLET | Refills: 0 | Status: SHIPPED | OUTPATIENT
Start: 2022-10-17 | End: 2022-10-22

## 2022-10-17 RX ADMIN — KETOROLAC TROMETHAMINE 30 MG: 30 INJECTION, SOLUTION INTRAMUSCULAR at 02:36

## 2022-10-17 NOTE — ED NOTES
Patient discharged by Tracy Smith MD - pt sent to the front lobby, with strong and steady gait, no acute distress noted at time of discharge - Discharge information / home RX / and reasons to return to the ED were reviewed by the ED provider.

## 2022-10-17 NOTE — ED PROVIDER NOTES
EMERGENCY DEPARTMENT HISTORY AND PHYSICAL EXAM      Date: 10/17/2022  Patient Name: London Forrest    Please note that this dictation was completed with Platform[employer], the computer voice recognition software. Quite often unanticipated grammatical, syntax, homophones, and other interpretive errors are inadvertently transcribed by the computer software. Please disregard these errors. Please excuse any errors that have escaped final proofreading. History of Presenting Illness     Chief Complaint   Patient presents with    Back Pain     Increasing low back pain since Saturday. No prior hx. No change in habits does do some lifting at work. Urine has been more concentrated. History Provided By: Patient     HPI: London Forrest, 42 y.o. male, presenting the emergency department complaining of low back pain.'s been going on since Saturday. He states that pain is worse with movement, worse with lifting. No radicular symptoms, no saddle anesthesia, urinary incontinence or retention. No pain with urination. Pain is in the lumbar spine. PCP: Moisés Garcia MD    No current facility-administered medications on file prior to encounter. Current Outpatient Medications on File Prior to Encounter   Medication Sig Dispense Refill    apixaban (ELIQUIS) 5 mg tablet Take 1 Tab by mouth two (2) times a day. 180 Tab 3    acetaminophen (TYLENOL) 650 mg TbER Take 1 Tab by mouth every eight (8) hours as needed for Pain. 60 Tab 2    cholecalciferol (VITAMIN D3) (1000 Units /25 mcg) tablet Take 1 Tab by mouth daily. 30 Tab 3    predniSONE (STERAPRED DS) 10 mg dose pack See administration instruction per 10mg dose pack 21 Tab 0    naproxen (NAPROSYN) 500 mg tablet Take 1 Tab by mouth two (2) times daily (with meals).  61 Tab 0       Past History     Past Medical History:  Past Medical History:   Diagnosis Date    Hypertension     Pulmonary embolism ([address]     Rotator cuff injury     Valvular heart disease     trace mitral valve regurg 8/19       Past Surgical History:  Past Surgical History:   Procedure Laterality Date    HX BACK SURGERY  1999    HX ORTHOPAEDIC      finger     HX SHOULDER ARTHROSCOPY         Family History:  Family History   Problem Relation Age of Onset    Asthma Mother     Hypertension Mother     Hypertension Father     Hypertension Paternal Uncle     Diabetes Paternal Grandmother        Social History:  Social History     Tobacco Use    Smoking status: Every Day     Packs/day: 0.50     Years: 0.00     Pack years: 0.00     Types: Cigarettes    Smokeless tobacco: Never   Substance Use Topics    Alcohol use: Yes     Comment: occasionally    Drug use: Yes     Types: Marijuana       Allergies: Allergies   Allergen Reactions    Morphine Nausea and Vomiting         Review of Systems   Review of Systems   Constitutional:  Negative for fever. HENT:  Negative for congestion. Respiratory:  Negative for shortness of breath. Musculoskeletal:  Positive for back pain and myalgias. Negative for arthralgias. Skin:  Negative for wound. Hematological:  Does not bruise/bleed easily. Physical Exam   Physical Exam  Constitutional:       Appearance: Normal appearance. HENT:      Head: Normocephalic and atraumatic. Nose: Nose normal.      Mouth/Throat:      Mouth: Mucous membranes are moist.   Neck:      Comments: Trachea midline  Cardiovascular:      Comments: Normal peripheral perfusion  Pulmonary:      Effort: Pulmonary effort is normal. No respiratory distress. Abdominal:      General: Abdomen is flat. There is no distension. Musculoskeletal:         General: No deformity. Normal range of motion. Comments: Mild tenderness to palpation in the paralumbar muscles. No midline point tenderness or step-off, negative straight leg raise, equal strength in the bilateral lower extremities with plantarflexion, dorsiflexion, hip flexion. Able to ambulate without difficulty.    Skin:     General: Skin is warm and dry. Neurological:      General: No focal deficit present. Mental Status: He is alert and oriented to person, place, and time. Psychiatric:         Mood and Affect: Mood normal.       Diagnostic Study Results     Labs -   No results found for this or any previous visit (from the past 12 hour(s)). Radiologic Studies -   No orders to display     CT Results  (Last 48 hours)      None          CXR Results  (Last 48 hours)      None              Medical Decision Making   I am the first provider for this patient. I reviewed the vital signs, available nursing notes, past medical history, past surgical history, family history and social history. Vital Signs-Reviewed the patient's vital signs. No data found. Records Reviewed:   Nursing notes, Prior visits     Provider Notes (Medical Decision Making): Insistent with musculoskeletal pain, no concern for epidural abscess, osteomyelitis or discitis. No evidence of cauda equina. Doubt renal cause of pain. Will treat pain symptomatically. Patient no longer on anticoagulation can start NSAIDs. ED Course:   Initial assessment performed. The patients presenting problems have been discussed, and they are in agreement with the care plan formulated and outlined with them. I have encouraged them to ask questions as they arise throughout their visit. Critical Care Time:   None      Disposition:    DISCHARGE NOTE  Patients results have been reviewed with them. Patient and/or family have verbally conveyed their understanding and agreement of the patient's signs, symptoms, diagnosis, treatment and prognosis and additionally agree to follow up as recommended or return to the Emergency Room should their condition change or have any new concerns prior to their follow-up appointment. Patient verbally agrees with the care-plan and verbally conveys that all of their questions have been answered.    Discharge instructions have also been provided to the patient with some educational information regarding their diagnosis as well a list of reasons why they would want to return to the ER prior to their follow-up appointment should their condition change. PLAN:  1. Discharge Medication List as of 10/17/2022  2:46 AM        START taking these medications    Details   oxyCODONE-acetaminophen (Percocet) 5-325 mg per tablet Take 1 Tablet by mouth every six (6) hours as needed for Pain for up to 3 days. Max Daily Amount: 4 Tablets., Normal, Disp-12 Tablet, R-0      predniSONE (DELTASONE) 20 mg tablet Take 3 Tablets by mouth daily for 5 days. With Breakfast, Normal, Disp-15 Tablet, R-0      methocarbamoL (Robaxin-750) 750 mg tablet Take 1 Tablet by mouth three (3) times daily as needed for Muscle Spasm(s). , Normal, Disp-15 Tablet, R-0           CONTINUE these medications which have NOT CHANGED    Details   apixaban (ELIQUIS) 5 mg tablet Take 1 Tab by mouth two (2) times a day., Normal, Disp-180 Tab,R-3,DOMINGA      acetaminophen (TYLENOL) 650 mg TbER Take 1 Tab by mouth every eight (8) hours as needed for Pain., Normal, Disp-60 Tab,R-2      cholecalciferol (VITAMIN D3) (1000 Units /25 mcg) tablet Take 1 Tab by mouth daily. , Normal, Disp-30 Tab,R-3      predniSONE (STERAPRED DS) 10 mg dose pack See administration instruction per 10mg dose pack, Normal, Disp-21 Tab,R-0      naproxen (NAPROSYN) 500 mg tablet Take 1 Tab by mouth two (2) times daily (with meals). , Normal, Disp-60 Tab,R-0           2. Follow-up Information       Follow up With Specialties Details Why Contact Info    Maxim Gutierrez MD Internal Medicine Physician Schedule an appointment as soon as possible for a visit   [address] [facility]  [address] [place] [address]  [phone]      [facility] EMERGENCY DEPT Emergency Medicine  If symptoms worsen [address] [facility]  [address] [facility]  [phone]            Return to ED if worse     Diagnosis     Clinical Impression:   1.  Acute bilateral low back pain without sciatica        Attestations:   This note was completed by Elzbieta Delgado,

## 2022-10-20 ENCOUNTER — OFFICE VISIT (OUTPATIENT)
Dept: FAMILY MEDICINE CLINIC | Age: 47
End: 2022-10-20
Payer: COMMERCIAL

## 2022-10-20 VITALS
WEIGHT: 250 LBS | BODY MASS INDEX: 39.24 KG/M2 | HEART RATE: 71 BPM | TEMPERATURE: 97.8 F | RESPIRATION RATE: 20 BRPM | DIASTOLIC BLOOD PRESSURE: 91 MMHG | OXYGEN SATURATION: 98 % | SYSTOLIC BLOOD PRESSURE: 146 MMHG | HEIGHT: 67 IN

## 2022-10-20 DIAGNOSIS — I10 HYPERTENSION GOAL BP (BLOOD PRESSURE) < 130/80: ICD-10-CM

## 2022-10-20 DIAGNOSIS — Z00.00 ENCOUNTER FOR ANNUAL PHYSICAL EXAM: Primary | ICD-10-CM

## 2022-10-20 DIAGNOSIS — E78.00 HYPERCHOLESTEROLEMIA: ICD-10-CM

## 2022-10-20 DIAGNOSIS — Z13.29 SCREENING FOR THYROID DISORDER: ICD-10-CM

## 2022-10-20 DIAGNOSIS — R73.02 IGT (IMPAIRED GLUCOSE TOLERANCE): ICD-10-CM

## 2022-10-20 DIAGNOSIS — M17.0 PRIMARY OSTEOARTHRITIS OF BOTH KNEES: ICD-10-CM

## 2022-10-20 DIAGNOSIS — R35.0 FREQUENCY OF URINATION: ICD-10-CM

## 2022-10-20 DIAGNOSIS — Z72.0 TOBACCO ABUSE DISORDER: ICD-10-CM

## 2022-10-20 DIAGNOSIS — E55.9 VITAMIN D DEFICIENCY: ICD-10-CM

## 2022-10-20 DIAGNOSIS — Z11.59 NEED FOR HEPATITIS C SCREENING TEST: ICD-10-CM

## 2022-10-20 PROCEDURE — 99396 PREV VISIT EST AGE 40-64: CPT | Performed by: INTERNAL MEDICINE

## 2022-10-20 RX ORDER — NAPROXEN 500 MG/1
500 TABLET ORAL 2 TIMES DAILY WITH MEALS
Qty: 60 TABLET | Refills: 0 | Status: SHIPPED | OUTPATIENT
Start: 2022-10-20 | End: 2022-10-26 | Stop reason: SDUPTHER

## 2022-10-20 RX ORDER — MELATONIN
1000 DAILY
Qty: 30 TABLET | Refills: 3 | Status: SHIPPED | OUTPATIENT
Start: 2022-10-20 | End: 2022-10-26 | Stop reason: SDUPTHER

## 2022-10-20 NOTE — PROGRESS NOTES
Chief Complaint   Patient presents with    Follow-up     Routine Check up      1. \"Have you been to the ER, urgent care clinic since your last visit? Hospitalized since your last visit? \"  Was seen in ER 10/16/2022    2. \"Have you seen or consulted any other health care providers outside of the [address] [place] [address] since your last visit? \" No     3. For patients aged 39-70: Has the patient had a colonoscopy / FIT/ Cologuard? No      If the patient is female:    4. For patients aged 41-77: Has the patient had a mammogram within the past 2 years? NA - based on age or sex      11. For patients aged 21-65: Has the patient had a pap smear?  NA - based on age or sex

## 2022-10-20 NOTE — PROGRESS NOTES
Chief Complaint   Patient presents with    Follow-up     Routine Check up     Claudication     legs     HPI:  Myles De La Rosa is a 42 y.o. AA male with h/o hypertension, PE, MR presents for overdue follow-up. Patient has not been seen in office since 2020. He says he no longer on anticoagulation. Blood pressure reading is not at goal.   Patient has c/o pain the legs when walking on and off. Denies injury, swelling/edema. He was seen in ER and started on prednisone and Robaxin. Review of Systems  As per hpi    Past Medical History:   Diagnosis Date    Hypertension     Pulmonary embolism ([address]     Rotator cuff injury     Valvular heart disease     trace mitral valve regurg 8/19     Past Surgical History:   Procedure Laterality Date    HX BACK SURGERY  1999    HX ORTHOPAEDIC      finger     HX SHOULDER ARTHROSCOPY       Social History     Socioeconomic History    Marital status: SINGLE   Tobacco Use    Smoking status: Every Day     Packs/day: 0.50     Years: 0.00     Pack years: 0.00     Types: Cigarettes    Smokeless tobacco: Never   Substance and Sexual Activity    Alcohol use: Yes     Comment: occasionally    Drug use: Yes     Types: Marijuana    Sexual activity: Yes     Partners: Female     Birth control/protection: None     Family History   Problem Relation Age of Onset    Asthma Mother     Hypertension Mother     Hypertension Father     Hypertension Paternal Uncle     Diabetes Paternal Grandmother      Current Outpatient Medications   Medication Sig Dispense Refill    oxyCODONE-acetaminophen (Percocet) 5-325 mg per tablet Take 1 Tablet by mouth every six (6) hours as needed for Pain for up to 3 days. Max Daily Amount: 4 Tablets. 12 Tablet 0    predniSONE (DELTASONE) 20 mg tablet Take 3 Tablets by mouth daily for 5 days. With Breakfast 15 Tablet 0    methocarbamoL (Robaxin-750) 750 mg tablet Take 1 Tablet by mouth three (3) times daily as needed for Muscle Spasm(s).  15 Tablet 0    acetaminophen (TYLENOL) 650 mg TbER Take 1 Tab by mouth every eight (8) hours as needed for Pain. 60 Tab 2    cholecalciferol (VITAMIN D3) (1000 Units /25 mcg) tablet Take 1 Tab by mouth daily. 30 Tab 3    predniSONE (STERAPRED DS) 10 mg dose pack See administration instruction per 10mg dose pack 21 Tab 0    naproxen (NAPROSYN) 500 mg tablet Take 1 Tab by mouth two (2) times daily (with meals). 60 Tab 0     Allergies   Allergen Reactions    Morphine Nausea and Vomiting       Objective:  Visit Vitals  BP (!) 146/91   Pulse 71   Temp 97.8 °F (36.6 °C) (Temporal)   Resp 20   Ht 5' 7\" (1.702 m)   Wt 250 lb (113.4 kg)   SpO2 98%   BMI 39.16 kg/m²     Physical Exam:   General appearance - alert, well appearing in no distress  Mental status - alert, oriented to person, place, and time  EYE-PERRL, EOMI  ENT-ENT exam normal, no neck nodes or sinus tenderness  Neck - supple, no significant adenopathy   Chest - clear to auscultation, no wheezes, rales or rhonchi  Heart - normal rate, regular rhythm, no murmurs  Abdomen - soft, nontender, nondistended, no organomegaly  Ext-peripheral pulses normal, no pedal edema  Skin-Warm and dry.  no hyperpigmentation or suspicious lesions  Neuro - no focal findings   Back-full range of motion, no tenderness, palpable spasm or pain on motion     Results for orders placed or performed during the hospital encounter of 10/17/22   URINALYSIS W/ REFLEX CULTURE    Specimen: Urine   Result Value Ref Range    Color YELLOW/STRAW      Appearance CLEAR CLEAR      Specific gravity 1.013      pH (UA) 7.0 5.0 - 8.0      Protein Negative NEG mg/dL    Glucose Negative NEG mg/dL    Ketone Negative NEG mg/dL    Bilirubin Negative NEG      Blood Negative NEG      Urobilinogen 1.0 0.2 - 1.0 EU/dL    Nitrites Negative NEG      Leukocyte Esterase Negative NEG      UA:UC IF INDICATED CULTURE NOT INDICATED BY UA RESULT      WBC 0-4 0 - 4 /hpf    RBC 0-5 0 - 5 /hpf    Epithelial cells FEW FEW /lpf    Bacteria Negative NEG /hpf Hyaline cast 0-2 0 - 2 /lpf     Assessment/Plan:  Diagnoses and all orders for this visit:    Encounter for annual physical exam  -     METABOLIC PANEL, COMPREHENSIVE; Future  -     CBC WITH AUTOMATED DIFF; Future    Vitamin D deficiency  -     cholecalciferol (VITAMIN D3) (1000 Units /25 mcg) tablet; Take 1 Tablet by mouth daily. , Normal, Disp-30 Tablet, R-3  -     VITAMIN D, 25 HYDROXY; Future  -     VITAMIN D, 25 HYDROXY    Tobacco abuse disorder    Hypercholesterolemia  -     LIPID PANEL; Future  -     LIPID PANEL    Screening for thyroid disorder  -     TSH 3RD GENERATION; Future  -     TSH 3RD GENERATION    IGT (impaired glucose tolerance)  -     HEMOGLOBIN A1C WITH EAG; Future  -     HEMOGLOBIN A1C WITH EAG    Frequency of urination  -     URINALYSIS W/ RFLX MICROSCOPIC; Future  -     URINALYSIS W/ RFLX MICROSCOPIC    Hypertension goal BP (blood pressure) < 762/20  -     METABOLIC PANEL, COMPREHENSIVE; Future  -     CBC WITH AUTOMATED DIFF; Future  -     CBC WITH AUTOMATED DIFF  -     METABOLIC PANEL, COMPREHENSIVE    Primary osteoarthritis of both knees  -     naproxen (NAPROSYN) 500 mg tablet; Take 1 Tablet by mouth two (2) times daily (with meals). , Normal, Disp-60 Tablet, R-0    Need for hepatitis C screening test  -     HEPATITIS C AB; Future  -     HEPATITIS C AB           Well Visit, Ages 25 to 48: Care Instructions  Overview     Well visits can help you stay healthy. Your doctor has checked your overall health and may have suggested ways to take good care of yourself. Your doctor also may have recommended tests. At home, you can help prevent illness with healthy eating, regular exercise, and other steps. Follow-up care is a key part of your treatment and safety. Be sure to make and go to all appointments, and call your doctor if you are having problems. It's also a good idea to know your test results and keep a list of the medicines you take. How can you care for yourself at home?   Get screening tests that you and your doctor decide on. Screening helps find diseases before any symptoms appear. Eat healthy foods. Choose fruits, vegetables, whole grains, protein, and low-fat dairy foods. Limit fat, especially saturated fat. Reduce salt in your diet. Limit alcohol. If you are a man, have no more than 2 drinks a day or 14 drinks a week. If you are a woman, have no more than 1 drink a day or 7 drinks a week. Get at least 30 minutes of physical activity on most days of the week. Walking is a good choice. You also may want to do other activities, such as running, swimming, cycling, or playing tennis or team sports. Discuss any changes in your exercise program with your doctor. Reach and stay at a healthy weight. This will lower your risk for many problems, such as obesity, diabetes, heart disease, and high blood pressure. Do not smoke or allow others to smoke around you. If you need help quitting, talk to your doctor about stop-smoking programs and medicines. These can increase your chances of quitting for good. Care for your mental health. It is easy to get weighed down by worry and stress. Learn strategies to manage stress, like deep breathing and mindfulness, and stay connected with your family and community. If you find you often feel sad or hopeless, talk with your doctor. Treatment can help. Talk to your doctor about whether you have any risk factors for sexually transmitted infections (STIs). You can help prevent STIs if you wait to have sex with a new partner (or partners) until you've each been tested for STIs. It also helps if you use condoms (male or female condoms) and if you limit your sex partners to one person who only has sex with you. Vaccines are available for some STIs, such as HPV. Use birth control if it's important to you to prevent pregnancy. Talk with your doctor about the choices available and what might be best for you.   If you think you may have a problem with alcohol or drug use, talk to your doctor. This includes prescription medicines (such as amphetamines and opioids) and illegal drugs (such as cocaine and methamphetamine). Your doctor can help you figure out what type of treatment is best for you. Protect your skin from too much sun. When you're outdoors from 10 a.m. to 4 p.m., stay in the shade or cover up with clothing and a hat with a wide brim. Wear sunglasses that block UV rays. Even when it's cloudy, put broad-spectrum sunscreen (SPF 30 or higher) on any exposed skin. See a dentist one or two times a year for checkups and to have your teeth cleaned. Wear a seat belt in the car. When should you call for help? Watch closely for changes in your health, and be sure to contact your doctor if you have any problems or symptoms that concern you. Where can you learn more? Go to http://www.pascual.com/  Enter P072 in the search box to learn more about \"Well Visit, Ages 25 to 48: Care Instructions. \"  Current as of: October 6, 2021               Content Version: 13.2  © 3356-0106 Healthwise, [employer]. Care instructions adapted under license by [employer] (which disclaims liability or warranty for this information). If you have questions about a medical condition or this instruction, always ask your healthcare professional. [address] any warranty or liability for your use of this information. Follow-up and Dispositions    Return 1-2 weeks, for result review.

## 2022-10-21 LAB
25(OH)D3+25(OH)D2 SERPL-MCNC: 27.2 NG/ML (ref 30–100)
ALBUMIN SERPL-MCNC: 4.6 G/DL (ref 4–5)
ALBUMIN/GLOB SERPL: 1.8 {RATIO} (ref 1.2–2.2)
ALP SERPL-CCNC: 53 IU/L (ref 44–121)
ALT SERPL-CCNC: 20 IU/L (ref 0–44)
APPEARANCE UR: CLEAR
AST SERPL-CCNC: 17 IU/L (ref 0–40)
BASOPHILS # BLD AUTO: 0 X10E3/UL (ref 0–0.2)
BASOPHILS NFR BLD AUTO: 0 %
BILIRUB SERPL-MCNC: 0.3 MG/DL (ref 0–1.2)
BILIRUB UR QL STRIP: NEGATIVE
BUN SERPL-MCNC: 10 MG/DL (ref 6–24)
BUN/CREAT SERPL: 10 (ref 9–20)
CALCIUM SERPL-MCNC: 9.8 MG/DL (ref 8.7–10.2)
CHLORIDE SERPL-SCNC: 102 MMOL/L (ref 96–106)
CHOLEST SERPL-MCNC: 202 MG/DL (ref 100–199)
CO2 SERPL-SCNC: 24 MMOL/L (ref 20–29)
COLOR UR: YELLOW
CREAT SERPL-MCNC: 1.04 MG/DL (ref 0.76–1.27)
EGFR: 91 ML/MIN/1.73
EOSINOPHIL # BLD AUTO: 0.1 X10E3/UL (ref 0–0.4)
EOSINOPHIL NFR BLD AUTO: 1 %
ERYTHROCYTE [DISTWIDTH] IN BLOOD BY AUTOMATED COUNT: 13 % (ref 11.6–15.4)
EST. AVERAGE GLUCOSE BLD GHB EST-MCNC: 100 MG/DL
GLOBULIN SER CALC-MCNC: 2.5 G/DL (ref 1.5–4.5)
GLUCOSE SERPL-MCNC: 75 MG/DL (ref 70–99)
GLUCOSE UR QL STRIP: NEGATIVE
HBA1C MFR BLD: 5.1 % (ref 4.8–5.6)
HCT VFR BLD AUTO: 46 % (ref 37.5–51)
HCV AB S/CO SERPL IA: 0.1 S/CO RATIO (ref 0–0.9)
HDLC SERPL-MCNC: 77 MG/DL
HGB BLD-MCNC: 15.8 G/DL (ref 13–17.7)
HGB UR QL STRIP: NEGATIVE
IMM GRANULOCYTES # BLD AUTO: 0 X10E3/UL (ref 0–0.1)
IMM GRANULOCYTES NFR BLD AUTO: 0 %
KETONES UR QL STRIP: NEGATIVE
LDLC SERPL CALC-MCNC: 113 MG/DL (ref 0–99)
LEUKOCYTE ESTERASE UR QL STRIP: NEGATIVE
LYMPHOCYTES # BLD AUTO: 2.8 X10E3/UL (ref 0.7–3.1)
LYMPHOCYTES NFR BLD AUTO: 31 %
MCH RBC QN AUTO: 30.7 PG (ref 26.6–33)
MCHC RBC AUTO-ENTMCNC: 34.3 G/DL (ref 31.5–35.7)
MCV RBC AUTO: 90 FL (ref 79–97)
MICRO URNS: NORMAL
MONOCYTES # BLD AUTO: 0.4 X10E3/UL (ref 0.1–0.9)
MONOCYTES NFR BLD AUTO: 4 %
NEUTROPHILS # BLD AUTO: 5.7 X10E3/UL (ref 1.4–7)
NEUTROPHILS NFR BLD AUTO: 64 %
NITRITE UR QL STRIP: NEGATIVE
PH UR STRIP: 5.5 [PH] (ref 5–7.5)
PLATELET # BLD AUTO: 178 X10E3/UL (ref 150–450)
POTASSIUM SERPL-SCNC: 4 MMOL/L (ref 3.5–5.2)
PROT SERPL-MCNC: 7.1 G/DL (ref 6–8.5)
PROT UR QL STRIP: NEGATIVE
RBC # BLD AUTO: 5.14 X10E6/UL (ref 4.14–5.8)
SODIUM SERPL-SCNC: 140 MMOL/L (ref 134–144)
SP GR UR STRIP: 1.01 (ref 1–1.03)
TRIGL SERPL-MCNC: 66 MG/DL (ref 0–149)
TSH SERPL DL<=0.005 MIU/L-ACNC: 0.81 UIU/ML (ref 0.45–4.5)
UROBILINOGEN UR STRIP-MCNC: 0.2 MG/DL (ref 0.2–1)
VLDLC SERPL CALC-MCNC: 12 MG/DL (ref 5–40)
WBC # BLD AUTO: 9 X10E3/UL (ref 3.4–10.8)

## 2022-10-26 ENCOUNTER — OFFICE VISIT (OUTPATIENT)
Dept: FAMILY MEDICINE CLINIC | Age: 47
End: 2022-10-26
Payer: COMMERCIAL

## 2022-10-26 VITALS
DIASTOLIC BLOOD PRESSURE: 60 MMHG | SYSTOLIC BLOOD PRESSURE: 107 MMHG | HEART RATE: 72 BPM | WEIGHT: 250 LBS | OXYGEN SATURATION: 97 % | HEIGHT: 67 IN | RESPIRATION RATE: 20 BRPM | TEMPERATURE: 97.1 F | BODY MASS INDEX: 39.24 KG/M2

## 2022-10-26 DIAGNOSIS — M17.0 PRIMARY OSTEOARTHRITIS OF BOTH KNEES: ICD-10-CM

## 2022-10-26 DIAGNOSIS — E66.01 OBESITY, MORBID (HCC): ICD-10-CM

## 2022-10-26 DIAGNOSIS — I10 HYPERTENSION, WELL CONTROLLED: ICD-10-CM

## 2022-10-26 DIAGNOSIS — E78.00 HYPERCHOLESTEROLEMIA: Primary | ICD-10-CM

## 2022-10-26 DIAGNOSIS — E55.9 VITAMIN D INSUFFICIENCY: ICD-10-CM

## 2022-10-26 PROCEDURE — 3074F SYST BP LT 130 MM HG: CPT | Performed by: INTERNAL MEDICINE

## 2022-10-26 PROCEDURE — 3078F DIAST BP <80 MM HG: CPT | Performed by: INTERNAL MEDICINE

## 2022-10-26 PROCEDURE — 99214 OFFICE O/P EST MOD 30 MIN: CPT | Performed by: INTERNAL MEDICINE

## 2022-10-26 RX ORDER — MELATONIN
1000 DAILY
Qty: 30 TABLET | Refills: 3 | Status: SHIPPED | OUTPATIENT
Start: 2022-10-26

## 2022-10-26 RX ORDER — METHOCARBAMOL 750 MG/1
750 TABLET, FILM COATED ORAL
Qty: 15 TABLET | Refills: 0 | Status: SHIPPED | OUTPATIENT
Start: 2022-10-26

## 2022-10-26 RX ORDER — NAPROXEN 500 MG/1
500 TABLET ORAL 2 TIMES DAILY WITH MEALS
Qty: 60 TABLET | Refills: 0 | Status: SHIPPED | OUTPATIENT
Start: 2022-10-26 | End: 2022-11-22

## 2022-10-26 NOTE — LETTER
10/26/2022    Mr. Lake Mark  [id number]      Dear Annamarie Favorite:    Please find your most recent results below. Serum cholesterol level is trending up, vit D level is slightly low. Rest of results are within normal limits  Resulted Orders   CBC WITH AUTOMATED DIFF   Result Value Ref Range    WBC 9.0 3.4 - 10.8 x10E3/uL    RBC 5.14 4.14 - 5.80 x10E6/uL    HGB 15.8 13.0 - 17.7 g/dL    HCT 46.0 37.5 - 51.0 %    MCV 90 79 - 97 fL    MCH 30.7 26.6 - 33.0 pg    MCHC 34.3 31.5 - 35.7 g/dL    RDW 13.0 11.6 - 15.4 %    PLATELET 095 298 - 970 x10E3/uL    NEUTROPHILS 64 Not Estab. %    Lymphocytes 31 Not Estab. %    MONOCYTES 4 Not Estab. %    EOSINOPHILS 1 Not Estab. %    BASOPHILS 0 Not Estab. %    ABS. NEUTROPHILS 5.7 1.4 - 7.0 x10E3/uL    Abs Lymphocytes 2.8 0.7 - 3.1 x10E3/uL    ABS. MONOCYTES 0.4 0.1 - 0.9 x10E3/uL    ABS. EOSINOPHILS 0.1 0.0 - 0.4 x10E3/uL    ABS. BASOPHILS 0.0 0.0 - 0.2 x10E3/uL    IMMATURE GRANULOCYTES 0 Not Estab. %    ABS. IMM. GRANS. 0.0 0.0 - 0.1 x10E3/uL    Narrative    Performed at:  [address]  [address]  [id number]  : Monda Leyden MD, Phone:  [phone]   METABOLIC PANEL, COMPREHENSIVE   Result Value Ref Range    Glucose 75 70 - 99 mg/dL    BUN 10 6 - 24 mg/dL    Creatinine 1.04 0.76 - 1.27 mg/dL    eGFR 91 >59 mL/min/1.73    BUN/Creatinine ratio 10 9 - 20    Sodium 140 134 - 144 mmol/L    Potassium 4.0 3.5 - 5.2 mmol/L    Chloride 102 96 - 106 mmol/L    CO2 24 20 - 29 mmol/L    Calcium 9.8 8.7 - 10.2 mg/dL    Protein, total 7.1 6.0 - 8.5 g/dL    Albumin 4.6 4.0 - 5.0 g/dL    GLOBULIN, TOTAL 2.5 1.5 - 4.5 g/dL    A-G Ratio 1.8 1.2 - 2.2    Bilirubin, total 0.3 0.0 - 1.2 mg/dL    Alk.  phosphatase 53 44 - 121 IU/L    AST (SGOT) 17 0 - 40 IU/L    ALT (SGPT) 20 0 - 44 IU/L    Narrative    Performed at:  [address] [employer] [address]  [id number]  : Monda Leyden MD, Phone:  [phone]   URINALYSIS W/ RFLX MICROSCOPIC   Result Value Ref Range    Specific Gravity 1.015 1.005 - 1.030    pH (UA) 5.5 5.0 - 7.5    Color Yellow Yellow    Appearance Clear Clear    Leukocyte Esterase Negative Negative    Protein Negative Negative/Trace    Glucose Negative Negative    Ketone Negative Negative    Blood Negative Negative    Bilirubin Negative Negative    Urobilinogen 0.2 0.2 - 1.0 mg/dL    Nitrites Negative Negative    Microscopic Examination Comment     Narrative    Performed at:  [address]  [id number]  : Emi Briscoe MD, Phone:  [phone]   LIPID PANEL   Result Value Ref Range    Cholesterol, total 202 (H) 100 - 199 mg/dL    Triglyceride 66 0 - 149 mg/dL    HDL Cholesterol 77 >39 mg/dL    VLDL, calculated 12 5 - 40 mg/dL    LDL, calculated 113 (H) 0 - 99 mg/dL    Narrative    Performed at:  [address]  [id number]  : Emi Briscoe MD, Phone:  [phone]   HEMOGLOBIN A1C WITH EAG   Result Value Ref Range    Hemoglobin A1c 5.1 4.8 - 5.6 %    Estimated average glucose 100 mg/dL    Narrative    Performed at:  [address]  [id number]  : Emi Briscoe MD, Phone:  [phone]   TSH 3RD GENERATION   Result Value Ref Range    TSH 0.815 0.450 - 4.500 uIU/mL    Narrative    Performed at:  [address]  [id number]  : Emi Briscoe MD, Phone:  [phone]   VITAMIN D, 25 HYDROXY   Result Value Ref Range    VITAMIN D, 25-HYDROXY 27.2 (L) 30.0 - 100.0 ng/mL    Narrative    Performed at:  [facility] [address]  [id number]  : Emi Briscoe MD, Phone:  [phone]   HEPATITIS C AB   Result Value Ref Range    Hep C Virus Ab 0.1 0.0 - 0.9 s/co ratio    Narrative    Performed at:  [address] [place], [place]  [id number]  : Wallace Harrison MD, Phone:  [phone]       RECOMMENDATIONS:  None. Keep up the good work! Work on diet and exercise. Continue with current  medications.     Please call me if you have any questions: [phone]    Sincerely,      Mar Blount MD

## 2022-10-26 NOTE — PROGRESS NOTES
Chief Complaint   Patient presents with    Results    Cyst     Side top of penis x 3 days     HPI:  Dario Saunders is a 42 y.o. male presents to review lab results. Serum cholesterol level is trending up, vit D level is slightly low. Rest of results are within normal limits    Also, he has additional complaints of rash on side top of penis x 3 days. Rash is not itchy or pain. No penile discharge or urinary symptoms. Review of Systems  As per hpi    Past Medical History:   Diagnosis Date    Hypertension     Pulmonary embolism ([address]     Rotator cuff injury     Valvular heart disease     trace mitral valve regurg 8/19     Past Surgical History:   Procedure Laterality Date    HX BACK SURGERY  1999    HX ORTHOPAEDIC      finger     HX SHOULDER ARTHROSCOPY       Social History     Socioeconomic History    Marital status: SINGLE   Tobacco Use    Smoking status: Every Day     Packs/day: 0.50     Years: 0.00     Pack years: 0.00     Types: Cigarettes    Smokeless tobacco: Never   Substance and Sexual Activity    Alcohol use: Yes     Comment: occasionally    Drug use: Yes     Types: Marijuana    Sexual activity: Yes     Partners: Female     Birth control/protection: None     Family History   Problem Relation Age of Onset    Asthma Mother     Hypertension Mother     Hypertension Father     Hypertension Paternal Uncle     Diabetes Paternal Grandmother      Current Outpatient Medications   Medication Sig Dispense Refill    cholecalciferol (VITAMIN D3) (1000 Units /25 mcg) tablet Take 1 Tablet by mouth daily. 30 Tablet 3    naproxen (NAPROSYN) 500 mg tablet Take 1 Tablet by mouth two (2) times daily (with meals). 60 Tablet 0    methocarbamoL (Robaxin-750) 750 mg tablet Take 1 Tablet by mouth three (3) times daily as needed for Muscle Spasm(s). 15 Tablet 0    acetaminophen (TYLENOL) 650 mg TbER Take 1 Tab by mouth every eight (8) hours as needed for Pain.  60 Tab 2     Allergies   Allergen Reactions    Morphine Nausea and Vomiting       Objective:  Visit Vitals  /60   Pulse 72   Temp 97.1 °F (36.2 °C) (Oral)   Resp 20   Ht 5' 7\" (1.702 m)   Wt 250 lb (113.4 kg)   SpO2 97%   BMI 39.16 kg/m²     Physical Exam:   General appearance - alert, well appearing in no distress  Mental status - alert, oriented to person, place, and time  Chest - clear to auscultation, no wheezes, rales or rhonchi  Heart - normal rate, regular rhythm, no murmurs  Abdomen - soft, nontender, nondistended, no organomegaly  Ext-peripheral pulses normal, no pedal edema  Skin-Warm and dry. no hyperpigmentation or suspicious lesions    Results for orders placed or performed in visit on 10/20/22   CBC WITH AUTOMATED DIFF   Result Value Ref Range    WBC 9.0 3.4 - 10.8 x10E3/uL    RBC 5.14 4.14 - 5.80 x10E6/uL    HGB 15.8 13.0 - 17.7 g/dL    HCT 46.0 37.5 - 51.0 %    MCV 90 79 - 97 fL    MCH 30.7 26.6 - 33.0 pg    MCHC 34.3 31.5 - 35.7 g/dL    RDW 13.0 11.6 - 15.4 %    PLATELET 435 413 - 954 x10E3/uL    NEUTROPHILS 64 Not Estab. %    Lymphocytes 31 Not Estab. %    MONOCYTES 4 Not Estab. %    EOSINOPHILS 1 Not Estab. %    BASOPHILS 0 Not Estab. %    ABS. NEUTROPHILS 5.7 1.4 - 7.0 x10E3/uL    Abs Lymphocytes 2.8 0.7 - 3.1 x10E3/uL    ABS. MONOCYTES 0.4 0.1 - 0.9 x10E3/uL    ABS. EOSINOPHILS 0.1 0.0 - 0.4 x10E3/uL    ABS. BASOPHILS 0.0 0.0 - 0.2 x10E3/uL    IMMATURE GRANULOCYTES 0 Not Estab. %    ABS. IMM. GRANS. 0.0 0.0 - 0.1 F34K4/AE   METABOLIC PANEL, COMPREHENSIVE   Result Value Ref Range    Glucose 75 70 - 99 mg/dL    BUN 10 6 - 24 mg/dL    Creatinine 1.04 0.76 - 1.27 mg/dL    eGFR 91 >59 mL/min/1.73    BUN/Creatinine ratio 10 9 - 20    Sodium 140 134 - 144 mmol/L    Potassium 4.0 3.5 - 5.2 mmol/L    Chloride 102 96 - 106 mmol/L    CO2 24 20 - 29 mmol/L    Calcium 9.8 8.7 - 10.2 mg/dL    Protein, total 7.1 6.0 - 8.5 g/dL    Albumin 4.6 4.0 - 5.0 g/dL    GLOBULIN, TOTAL 2.5 1.5 - 4.5 g/dL    A-G Ratio 1.8 1.2 - 2.2    Bilirubin, total 0.3 0.0 - 1.2 mg/dL    Alk. phosphatase 53 44 - 121 IU/L    AST (SGOT) 17 0 - 40 IU/L    ALT (SGPT) 20 0 - 44 IU/L   URINALYSIS W/ RFLX MICROSCOPIC   Result Value Ref Range    Specific Gravity 1.015 1.005 - 1.030    pH (UA) 5.5 5.0 - 7.5    Color Yellow Yellow    Appearance Clear Clear    Leukocyte Esterase Negative Negative    Protein Negative Negative/Trace    Glucose Negative Negative    Ketone Negative Negative    Blood Negative Negative    Bilirubin Negative Negative    Urobilinogen 0.2 0.2 - 1.0 mg/dL    Nitrites Negative Negative    Microscopic Examination Comment    LIPID PANEL   Result Value Ref Range    Cholesterol, total 202 (H) 100 - 199 mg/dL    Triglyceride 66 0 - 149 mg/dL    HDL Cholesterol 77 >39 mg/dL    VLDL, calculated 12 5 - 40 mg/dL    LDL, calculated 113 (H) 0 - 99 mg/dL   HEMOGLOBIN A1C WITH EAG   Result Value Ref Range    Hemoglobin A1c 5.1 4.8 - 5.6 %    Estimated average glucose 100 mg/dL   TSH 3RD GENERATION   Result Value Ref Range    TSH 0.815 0.450 - 4.500 uIU/mL   VITAMIN D, 25 HYDROXY   Result Value Ref Range    VITAMIN D, 25-HYDROXY 27.2 (L) 30.0 - 100.0 ng/mL   HEPATITIS C AB   Result Value Ref Range    Hep C Virus Ab 0.1 0.0 - 0.9 s/co ratio     Assessment/Plan:  Diagnoses and all orders for this visit:    Hypercholesterolemia    Primary osteoarthritis of both knees  -     naproxen (NAPROSYN) 500 mg tablet; Take 1 Tablet by mouth two (2) times daily (with meals). , Normal, Disp-60 Tablet, R-0  -     methocarbamoL (Robaxin-750) 750 mg tablet; Take 1 Tablet by mouth three (3) times daily as needed for Muscle Spasm(s). , Normal, Disp-15 Tablet, R-0    Obesity, morbid (HCC)    Hypertension, well controlled    Vitamin D insufficiency  -     cholecalciferol (VITAMIN D3) (1000 Units /25 mcg) tablet; Take 1 Tablet by mouth daily. , Normal, Disp-30 Tablet, R-3    Learning About Foods With Healthy Fats  What foods contain healthy fats? The foods you eat contain nutrients, such as vitamins and minerals.  Fat is a nutrient. Your body needs the right amount to stay healthy and work as it should. You can use the list below to help you make choices about which foods to eat. Here are some foods that contain healthy fats. Unsaturated fats and oils  Canola oil  Corn oil  Flaxseed oil  Olive oil  Peanut oil  Safflower oil  Sunflower oil  [address] [facility] trout  Mackerel  Oysters  [place]  Sardines  Nuts and seeds  Almonds  Shekhar seeds  Flaxseeds (ground)  Hazelnuts  Nut butters (such as peanut and almond)  Pumpkin seeds  Sunflower seeds  Walnuts  Fruits  Avocados  Olives  Work with your doctor to find out how much of this nutrient you need. Depending on your health, you may need more or less of it in your diet. Where can you learn more? Go to http://www.pascual.com/  Enter F360 in the search box to learn more about \"Learning About Foods With Healthy Fats. \"  Current as of: May 9, 2022               Content Version: 13.4  © 2006-2022 Healthwise, [facility]. Care instructions adapted under license by [employer] (which disclaims liability or warranty for this information). If you have questions about a medical condition or this instruction, always ask your healthcare professional. [place] [address] any warranty or liability for your use of this information. Follow-up and Dispositions    Return in about 4 months (around 2/26/2023) for routine follow up.

## 2022-11-22 DIAGNOSIS — M17.0 PRIMARY OSTEOARTHRITIS OF BOTH KNEES: ICD-10-CM

## 2022-11-22 RX ORDER — NAPROXEN 500 MG/1
TABLET ORAL
Qty: 60 TABLET | Refills: 0 | Status: SHIPPED | OUTPATIENT
Start: 2022-11-22

## 2023-01-26 ENCOUNTER — OFFICE VISIT (OUTPATIENT)
Dept: FAMILY MEDICINE CLINIC | Age: 48
End: 2023-01-26
Payer: COMMERCIAL

## 2023-01-26 VITALS
HEART RATE: 66 BPM | OXYGEN SATURATION: 99 % | RESPIRATION RATE: 20 BRPM | WEIGHT: 250 LBS | DIASTOLIC BLOOD PRESSURE: 60 MMHG | BODY MASS INDEX: 39.24 KG/M2 | HEIGHT: 67 IN | SYSTOLIC BLOOD PRESSURE: 113 MMHG | TEMPERATURE: 98.6 F

## 2023-01-26 DIAGNOSIS — M25.511 ACUTE PAIN OF RIGHT SHOULDER: ICD-10-CM

## 2023-01-26 DIAGNOSIS — Z12.11 COLON CANCER SCREENING: ICD-10-CM

## 2023-01-26 DIAGNOSIS — E78.00 HYPERCHOLESTEROLEMIA: Primary | ICD-10-CM

## 2023-01-26 DIAGNOSIS — E55.9 VITAMIN D INSUFFICIENCY: ICD-10-CM

## 2023-01-26 DIAGNOSIS — I73.9 CLAUDICATION (HCC): ICD-10-CM

## 2023-01-26 DIAGNOSIS — I10 HYPERTENSION, WELL CONTROLLED: ICD-10-CM

## 2023-01-26 RX ORDER — NAPROXEN 500 MG/1
500 TABLET ORAL 2 TIMES DAILY WITH MEALS
Qty: 60 TABLET | Refills: 0 | Status: SHIPPED | OUTPATIENT
Start: 2023-01-26

## 2023-01-26 RX ORDER — METHOCARBAMOL 750 MG/1
750 TABLET, FILM COATED ORAL
Qty: 15 TABLET | Refills: 0 | Status: SHIPPED | OUTPATIENT
Start: 2023-01-26

## 2023-01-26 NOTE — PROGRESS NOTES
Chief Complaint   Patient presents with    Follow-up     3 MONTH Routine Check Up    Shoulder Pain     Right Shoulder     Finger Pain     Cold finger tips     CHF     HPI:  Sandee Rodriguez is a 41 y.o. AA male with significant medical history below including heart failure, hypertension presents for 3 month  follow-up. Patient has c/o worsening right shoulder pain with movement and when lying on it. Patient has complaint of pain of finger tips when exposed to cold. Also, he has additional complaints of Calf muscle cramp/pain when walking. Symptoms resolve with rest.  Blood pressure is at goal.    Review of Systems  As per hpi    Past Medical History:   Diagnosis Date    Hypertension     Pulmonary embolism ([address]     Rotator cuff injury     Valvular heart disease     trace mitral valve regurg 8/19     Past Surgical History:   Procedure Laterality Date    HX BACK SURGERY  1999    HX ORTHOPAEDIC      finger     HX SHOULDER ARTHROSCOPY       Social History     Socioeconomic History    Marital status: SINGLE   Tobacco Use    Smoking status: Every Day     Packs/day: 0.50     Years: 0.00     Pack years: 0.00     Types: Cigarettes    Smokeless tobacco: Never   Substance and Sexual Activity    Alcohol use: Yes     Comment: occasionally    Drug use: Yes     Types: Marijuana    Sexual activity: Yes     Partners: Female     Birth control/protection: None     Family History   Problem Relation Age of Onset    Asthma Mother     Hypertension Mother     Hypertension Father     Hypertension Paternal Uncle     Diabetes Paternal Grandmother      Current Outpatient Medications   Medication Sig Dispense Refill    naproxen (NAPROSYN) 500 mg tablet TAKE 1 TABLET BY MOUTH TWICE DAILY WITH MEALS 60 Tablet 0    methocarbamoL (Robaxin-750) 750 mg tablet Take 1 Tablet by mouth three (3) times daily as needed for Muscle Spasm(s). 15 Tablet 0    cholecalciferol (VITAMIN D3) (1000 Units /25 mcg) tablet Take 1 Tablet by mouth daily.  30 Tablet 3     Allergies   Allergen Reactions    Morphine Nausea and Vomiting       Objective:  Visit Vitals  /60   Pulse 66   Temp 98.6 °F (37 °C) (Oral)   Resp 20   Ht 5' 7\" (1.702 m)   Wt 250 lb (113.4 kg)   SpO2 99%   BMI 39.16 kg/m²     Physical Exam:   General appearance - alert, well appearing in no distress  Mental status - alert, oriented to person, place, and time  Neck - supple, no JVD, no bruit    Chest - clear to auscultation, no wheezes, rales or rhonchi  Heart - normal rate, regular rhythm, no murmurs  Abdomen - soft, nontender, nondistended, no organomegaly  Ext-peripheral pulses normal, no pedal edema  Neuro -no focal findings   Back-full range of motion, no tenderness, palpable spasm or pain on motion     Results for orders placed or performed in visit on 10/20/22   CBC WITH AUTOMATED DIFF   Result Value Ref Range    WBC 9.0 3.4 - 10.8 x10E3/uL    RBC 5.14 4.14 - 5.80 x10E6/uL    HGB 15.8 13.0 - 17.7 g/dL    HCT 46.0 37.5 - 51.0 %    MCV 90 79 - 97 fL    MCH 30.7 26.6 - 33.0 pg    MCHC 34.3 31.5 - 35.7 g/dL    RDW 13.0 11.6 - 15.4 %    PLATELET 867 671 - 924 x10E3/uL    NEUTROPHILS 64 Not Estab. %    Lymphocytes 31 Not Estab. %    MONOCYTES 4 Not Estab. %    EOSINOPHILS 1 Not Estab. %    BASOPHILS 0 Not Estab. %    ABS. NEUTROPHILS 5.7 1.4 - 7.0 x10E3/uL    Abs Lymphocytes 2.8 0.7 - 3.1 x10E3/uL    ABS. MONOCYTES 0.4 0.1 - 0.9 x10E3/uL    ABS. EOSINOPHILS 0.1 0.0 - 0.4 x10E3/uL    ABS. BASOPHILS 0.0 0.0 - 0.2 x10E3/uL    IMMATURE GRANULOCYTES 0 Not Estab. %    ABS. IMM.  GRANS. 0.0 0.0 - 0.1 [id number]/[id number]   METABOLIC PANEL, COMPREHENSIVE   Result Value Ref Range    Glucose 75 70 - 99 mg/dL    BUN 10 6 - 24 mg/dL    Creatinine 1.04 0.76 - 1.27 mg/dL    eGFR 91 >59 mL/min/1.73    BUN/Creatinine ratio 10 9 - 20    Sodium 140 134 - 144 mmol/L    Potassium 4.0 3.5 - 5.2 mmol/L    Chloride 102 96 - 106 mmol/L    CO2 24 20 - 29 mmol/L    Calcium 9.8 8.7 - 10.2 mg/dL    Protein, total 7.1 6.0 - 8.5 g/dL    Albumin 4.6 4.0 - 5.0 g/dL    GLOBULIN, TOTAL 2.5 1.5 - 4.5 g/dL    A-G Ratio 1.8 1.2 - 2.2    Bilirubin, total 0.3 0.0 - 1.2 mg/dL    Alk. phosphatase 53 44 - 121 IU/L    AST (SGOT) 17 0 - 40 IU/L    ALT (SGPT) 20 0 - 44 IU/L   URINALYSIS W/ RFLX MICROSCOPIC   Result Value Ref Range    Specific Gravity 1.015 1.005 - 1.030    pH (UA) 5.5 5.0 - 7.5    Color Yellow Yellow    Appearance Clear Clear    Leukocyte Esterase Negative Negative    Protein Negative Negative/Trace    Glucose Negative Negative    Ketone Negative Negative    Blood Negative Negative    Bilirubin Negative Negative    Urobilinogen 0.2 0.2 - 1.0 mg/dL    Nitrites Negative Negative    Microscopic Examination Comment    LIPID PANEL   Result Value Ref Range    Cholesterol, total 202 (H) 100 - 199 mg/dL    Triglyceride 66 0 - 149 mg/dL    HDL Cholesterol 77 >39 mg/dL    VLDL, calculated 12 5 - 40 mg/dL    LDL, calculated 113 (H) 0 - 99 mg/dL   HEMOGLOBIN A1C WITH EAG   Result Value Ref Range    Hemoglobin A1c 5.1 4.8 - 5.6 %    Estimated average glucose 100 mg/dL   TSH 3RD GENERATION   Result Value Ref Range    TSH 0.815 0.450 - 4.500 uIU/mL   VITAMIN D, 25 HYDROXY   Result Value Ref Range    VITAMIN D, 25-HYDROXY 27.2 (L) 30.0 - 100.0 ng/mL   HEPATITIS C AB   Result Value Ref Range    Hep C Virus Ab 0.1 0.0 - 0.9 s/co ratio       Assessment/Plan:  Diagnoses and all orders for this visit:    Hypercholesterolemia  -     LIPID PANEL; Future    Vitamin D insufficiency  -     VITAMIN D, 25 HYDROXY; Future    Hypertension, well controlled  -     METABOLIC PANEL, COMPREHENSIVE; Future    Acute pain of right shoulder  -     naproxen (NAPROSYN) 500 mg tablet; Take 1 Tablet by mouth two (2) times daily (with meals). , Normal, Disp-60 Tablet, R-0  -     methocarbamoL (Robaxin-750) 750 mg tablet; Take 1 Tablet by mouth three (3) times daily as needed for Muscle Spasm(s). , Normal, Disp-15 Tablet, R-0    Colon cancer screening  -     REFERRAL TO GASTROENTEROLOGY    Claud[facility] ([facility]  -     ANKLE BRACHIAL INDEX; Future      Follow-up and Dispositions    Return in about 4 weeks (around 2/23/2023) for f/u treatment and results.

## 2023-01-26 NOTE — PROGRESS NOTES
1. \"Have you been to the ER, urgent care clinic since your last visit? Hospitalized since your last visit? \" No    2. \"Have you seen or consulted any other health care providers outside of the [address] [place] [address] since your last visit? \" No     3. For patients aged 39-70: Has the patient had a colonoscopy / FIT/ Cologuard? No      If the patient is female:    4. For patients aged 41-77: Has the patient had a mammogram within the past 2 years? NA - based on age or sex      11. For patients aged 21-65: Has the patient had a pap smear?  NA - based on age or sex

## 2023-02-13 ENCOUNTER — HOSPITAL ENCOUNTER (OUTPATIENT)
Dept: VASCULAR SURGERY | Age: 48
Discharge: HOME | End: 2023-02-13
Attending: INTERNAL MEDICINE
Payer: COMMERCIAL

## 2023-02-13 DIAGNOSIS — I73.9 CLAUDICATION (HCC): ICD-10-CM

## 2023-02-13 PROCEDURE — 93922 UPR/L XTREMITY ART 2 LEVELS: CPT

## 2023-02-15 LAB
LEFT ABI: 1.3
LEFT ARM BP: 162 MMHG
LEFT POSTERIOR TIBIAL: 210 MMHG
LEFT TBI: 0.99
LEFT TOE PRESSURE: 160 MMHG
RIGHT ABI: 1.28
RIGHT ARM BP: 152 MMHG
RIGHT POSTERIOR TIBIAL: 205 MMHG
RIGHT TBI: 1.12
RIGHT TOE PRESSURE: 182 MMHG
VAS LEFT DORSALIS PEDIS BP: 190 MMHG
VAS RIGHT DORSALIS PEDIS BP: 207 MMHG

## 2023-02-27 DIAGNOSIS — M25.511 ACUTE PAIN OF RIGHT SHOULDER: ICD-10-CM

## 2023-02-27 RX ORDER — NAPROXEN 500 MG/1
TABLET ORAL
Qty: 60 TABLET | Refills: 0 | Status: SHIPPED | OUTPATIENT
Start: 2023-02-27

## 2023-02-28 ENCOUNTER — OFFICE VISIT (OUTPATIENT)
Dept: FAMILY MEDICINE CLINIC | Age: 48
End: 2023-02-28
Payer: COMMERCIAL

## 2023-02-28 VITALS
WEIGHT: 250 LBS | BODY MASS INDEX: 39.24 KG/M2 | OXYGEN SATURATION: 98 % | DIASTOLIC BLOOD PRESSURE: 69 MMHG | HEART RATE: 71 BPM | TEMPERATURE: 97.1 F | RESPIRATION RATE: 20 BRPM | HEIGHT: 67 IN | SYSTOLIC BLOOD PRESSURE: 120 MMHG

## 2023-02-28 DIAGNOSIS — E55.9 VITAMIN D INSUFFICIENCY: Primary | ICD-10-CM

## 2023-02-28 DIAGNOSIS — I73.00 RAYNAUD'S PHENOMENON WITHOUT GANGRENE: ICD-10-CM

## 2023-02-28 PROCEDURE — 3074F SYST BP LT 130 MM HG: CPT | Performed by: INTERNAL MEDICINE

## 2023-02-28 PROCEDURE — 99214 OFFICE O/P EST MOD 30 MIN: CPT | Performed by: INTERNAL MEDICINE

## 2023-02-28 PROCEDURE — 3078F DIAST BP <80 MM HG: CPT | Performed by: INTERNAL MEDICINE

## 2023-02-28 RX ORDER — MELATONIN
1000 DAILY
Qty: 30 TABLET | Refills: 3 | Status: SHIPPED | OUTPATIENT
Start: 2023-02-28

## 2023-02-28 RX ORDER — LANOLIN ALCOHOL/MO/W.PET/CERES
400 CREAM (GRAM) TOPICAL DAILY
Qty: 30 TABLET | Refills: 1 | Status: SHIPPED | OUTPATIENT
Start: 2023-02-28

## 2023-02-28 RX ORDER — IBUPROFEN 600 MG/1
TABLET ORAL
COMMUNITY
Start: 2023-02-17

## 2023-02-28 NOTE — PROGRESS NOTES
Chief Complaint   Patient presents with    Results            HPI:  Molly Esteves is a 41 y.o. AA male presents for lab results review    For the most part, results are within normal limits. Patient is requesting vit rand magnesium refill    Review of Systems  As per hpi    Past Medical History:   Diagnosis Date    Hypertension     Pulmonary embolism ([address]     Rotator cuff injury     Valvular heart disease     trace mitral valve regurg 8/19     Past Surgical History:   Procedure Laterality Date    HX BACK SURGERY  1999    HX ORTHOPAEDIC      finger     HX SHOULDER ARTHROSCOPY       Social History     Socioeconomic History    Marital status: SINGLE   Tobacco Use    Smoking status: Every Day     Packs/day: 0.50     Years: 0.00     Pack years: 0.00     Types: Cigarettes    Smokeless tobacco: Never   Substance and Sexual Activity    Alcohol use: Yes     Comment: occasionally    Drug use: Yes     Types: Marijuana    Sexual activity: Yes     Partners: Female     Birth control/protection: None     Family History   Problem Relation Age of Onset    Asthma Mother     Hypertension Mother     Hypertension Father     Hypertension Paternal Uncle     Diabetes Paternal Grandmother      Current Outpatient Medications   Medication Sig Dispense Refill    ibuprofen (MOTRIN) 600 mg tablet       naproxen (NAPROSYN) 500 mg tablet TAKE 1 TABLET BY MOUTH TWICE DAILY WITH MEALS 60 Tablet 0    methocarbamoL (Robaxin-750) 750 mg tablet Take 1 Tablet by mouth three (3) times daily as needed for Muscle Spasm(s). 15 Tablet 0    cholecalciferol (VITAMIN D3) (1000 Units /25 mcg) tablet Take 1 Tablet by mouth daily.  30 Tablet 3     Allergies   Allergen Reactions    Morphine Nausea and Vomiting       Objective:  Visit Vitals  /69   Pulse 71   Temp 97.1 °F (36.2 °C) (Temporal)   Resp 20   Ht 5' 7\" (1.702 m)   Wt 250 lb (113.4 kg)   SpO2 98%   BMI 39.16 kg/m²     Physical Exam:   General appearance - alert, well appearing in no distress  Mental status - alert, oriented to person, place, and time  Neck - supple, no significant adenopathy   Chest - clear to auscultation, no wheezes, rales or rhonchi  Heart - normal rate, regular rhythm, no murmurs  Abdomen - soft, nontender, nondistended, no organomegaly  Ext-peripheral pulses normal, no pedal edema    Results for orders placed or performed during the hospital encounter of 02/13/23   ANKLE BRACHIAL INDEX   Result Value Ref Range    Left arm  mmHg    Left posterior tibial 210 mmHg    Left dorsalis pedis  mmHg    Left toe pressure 160 mmHg    Right arm  mmHg    Right posterior tibial 205 mmHg    Right dorsalis pedis  mmHg    Right toe pressure 182 mmHg    Left JAYLEN 1.30     Right JAYLEN 1.28     Left TBI 0.99     Right TBI 1.12      Assessment/Plan:  Diagnoses and all orders for this visit:    Vitamin D insufficiency  -     cholecalciferol (VITAMIN D3) (1000 Units /25 mcg) tablet; Take 1 Tablet by mouth daily. , Normal, Disp-30 Tablet, R-3    Raynaud's phenomenon without gangrene  -     magnesium oxide (MAG-OX) 400 mg tablet; Take 1 Tablet by mouth daily. , Normal, Disp-30 Tablet, R-1      Follow-up and Dispositions    Return in about 3 months (around 5/28/2023) for routine follow up.

## 2023-02-28 NOTE — PROGRESS NOTES
1. \"Have you been to the ER, urgent care clinic since your last visit? Hospitalized since your last visit? \"  Was seen at [facility] for left knee injury     2. \"Have you seen or consulted any other health care providers outside of the [address] [place] [address] since your last visit? \"  Have appt with othro for knee injury       3. For patients aged 39-70: Has the patient had a colonoscopy / FIT/ Cologuard? No      If the patient is female:    4. For patients aged 41-77: Has the patient had a mammogram within the past 2 years? No      5. For patients aged 21-65: Has the patient had a pap smear?  No

## 2023-02-28 NOTE — LETTER
2/28/2023    Mr. Jaya Flores  [phone]      Dear Nena Calix:    Please find your most recent results below. Resulted Orders   METABOLIC PANEL, COMPREHENSIVE   Result Value Ref Range    Glucose 78 70 - 99 mg/dL    BUN 13 6 - 24 mg/dL    Creatinine 1.17 0.76 - 1.27 mg/dL    eGFR 78 >59 mL/min/1.73    BUN/Creatinine ratio 11 9 - 20    Sodium 139 134 - 144 mmol/L    Potassium 4.4 3.5 - 5.2 mmol/L    Chloride 101 96 - 106 mmol/L    CO2 26 20 - 29 mmol/L    Calcium 9.4 8.7 - 10.2 mg/dL    Protein, total 6.8 6.0 - 8.5 g/dL    Albumin 4.4 4.0 - 5.0 g/dL    GLOBULIN, TOTAL 2.4 1.5 - 4.5 g/dL    A-G Ratio 1.8 1.2 - 2.2    Bilirubin, total 0.3 0.0 - 1.2 mg/dL    Alk. phosphatase 46 44 - 121 IU/L    AST (SGOT) 14 0 - 40 IU/L    ALT (SGPT) 14 0 - 44 IU/L    Narrative    Performed at:  [address]  [id number]  : Mary Tang MD, Phone:  [phone]   LIPID PANEL   Result Value Ref Range    Cholesterol, total 174 100 - 199 mg/dL    Triglyceride 71 0 - 149 mg/dL    HDL Cholesterol 62 >39 mg/dL    VLDL, calculated 13 5 - 40 mg/dL    LDL, calculated 99 0 - 99 mg/dL    Narrative    Performed at:  [facility] [facility]  [address]  [id number]  : Mary Tang MD, Phone:  [phone]   VITAMIN D, 25 HYDROXY   Result Value Ref Range    VITAMIN D, 25-HYDROXY 30.1 30.0 - 100.0 ng/mL    Narrative    Performed at:  [address] [facility]  [id number]  : Mary Tang MD, Phone:  [phone]   CVD REPORT   Result Value Ref Range    INTERPRETATION Note     Narrative    Performed at:  [address]  [id number]  : Tayler Davalos PhD, Phone:  [phone]       RECOMMENDATIONS:  None. Keep up the good work! Work on diet and exercise. Continue with current  medications.     Please call me if you have any questions: [phone]    Sincerely,      Irineo Rose MD

## 2023-09-18 ENCOUNTER — HOSPITAL ENCOUNTER (OUTPATIENT)
Facility: HOSPITAL | Age: 48
Discharge: HOME | End: 2023-09-21
Payer: COMMERCIAL

## 2023-09-18 DIAGNOSIS — M94.262 CHONDROMALACIA OF LEFT KNEE: ICD-10-CM

## 2023-09-18 DIAGNOSIS — M17.32 POST-TRAUMATIC OSTEOARTHRITIS OF LEFT KNEE: ICD-10-CM

## 2023-09-18 DIAGNOSIS — M23.42 LOOSE BODY OF LEFT KNEE: ICD-10-CM

## 2023-09-18 DIAGNOSIS — S83.92XA SPRAIN OF LEFT KNEE, INITIAL ENCOUNTER: ICD-10-CM

## 2023-09-18 DIAGNOSIS — M25.462 SWELLING OF LEFT KNEE JOINT: ICD-10-CM

## 2023-09-18 PROCEDURE — 73721 MRI JNT OF LWR EXTRE W/O DYE: CPT

## 2023-10-03 ENCOUNTER — APPOINTMENT (OUTPATIENT)
Facility: HOSPITAL | Age: 48
End: 2023-10-03
Payer: COMMERCIAL

## 2023-10-03 ENCOUNTER — HOSPITAL ENCOUNTER (EMERGENCY)
Facility: HOSPITAL | Age: 48
Discharge: HOME | End: 2023-10-03
Attending: STUDENT IN AN ORGANIZED HEALTH CARE EDUCATION/TRAINING PROGRAM
Payer: COMMERCIAL

## 2023-10-03 VITALS
TEMPERATURE: 98 F | HEART RATE: 89 BPM | DIASTOLIC BLOOD PRESSURE: 87 MMHG | OXYGEN SATURATION: 99 % | RESPIRATION RATE: 18 BRPM | SYSTOLIC BLOOD PRESSURE: 145 MMHG

## 2023-10-03 DIAGNOSIS — M25.562 ACUTE PAIN OF LEFT KNEE: Primary | ICD-10-CM

## 2023-10-03 PROCEDURE — 99283 EMERGENCY DEPT VISIT LOW MDM: CPT

## 2023-10-03 PROCEDURE — 73562 X-RAY EXAM OF KNEE 3: CPT

## 2023-10-03 PROCEDURE — 6370000000 HC RX 637 (ALT 250 FOR IP): Performed by: STUDENT IN AN ORGANIZED HEALTH CARE EDUCATION/TRAINING PROGRAM

## 2023-10-03 RX ORDER — LIDOCAINE 4 G/G
1 PATCH TOPICAL ONCE
Status: DISCONTINUED | OUTPATIENT
Start: 2023-10-03 | End: 2023-10-03 | Stop reason: HOSPADM

## 2023-10-03 RX ORDER — METHOCARBAMOL 750 MG/1
750 TABLET, FILM COATED ORAL 4 TIMES DAILY PRN
Qty: 20 TABLET | Refills: 0 | Status: SHIPPED | OUTPATIENT
Start: 2023-10-03 | End: 2023-10-13

## 2023-10-03 RX ORDER — NAPROXEN 500 MG/1
500 TABLET ORAL 2 TIMES DAILY PRN
Qty: 30 TABLET | Refills: 0 | Status: SHIPPED | OUTPATIENT
Start: 2023-10-03

## 2023-10-03 RX ORDER — LIDOCAINE 4 G/G
1 PATCH TOPICAL DAILY
Qty: 10 PATCH | Refills: 0 | Status: SHIPPED | OUTPATIENT
Start: 2023-10-03 | End: 2023-11-02

## 2023-10-03 RX ORDER — NAPROXEN 250 MG/1
500 TABLET ORAL ONCE
Status: COMPLETED | OUTPATIENT
Start: 2023-10-03 | End: 2023-10-03

## 2023-10-03 RX ORDER — ACETAMINOPHEN 500 MG
1000 TABLET ORAL
Status: COMPLETED | OUTPATIENT
Start: 2023-10-03 | End: 2023-10-03

## 2023-10-03 RX ADMIN — ACETAMINOPHEN 1000 MG: 500 TABLET ORAL at 20:10

## 2023-10-03 RX ADMIN — NAPROXEN 500 MG: 250 TABLET ORAL at 20:09

## 2023-10-03 ASSESSMENT — PAIN SCALES - GENERAL
PAINLEVEL_OUTOF10: 10
PAINLEVEL_OUTOF10: 10

## 2023-10-03 ASSESSMENT — PAIN DESCRIPTION - ORIENTATION
ORIENTATION: LEFT
ORIENTATION: LEFT

## 2023-10-03 ASSESSMENT — PAIN - FUNCTIONAL ASSESSMENT: PAIN_FUNCTIONAL_ASSESSMENT: 0-10

## 2023-10-03 ASSESSMENT — PAIN DESCRIPTION - LOCATION
LOCATION: KNEE
LOCATION: KNEE

## 2023-10-03 ASSESSMENT — PAIN DESCRIPTION - DESCRIPTORS
DESCRIPTORS: SHOOTING;SHARP
DESCRIPTORS: ACHING

## 2023-10-03 ASSESSMENT — ENCOUNTER SYMPTOMS
ABDOMINAL PAIN: 0
SHORTNESS OF BREATH: 0
EYE DISCHARGE: 0

## 2023-10-03 NOTE — ED TRIAGE NOTES
Pt reports L knee pain and swelling x2 days. Pt reports he may have twisted it on Monday. Pt has been taking percocet with some relief. Pt denies fall. Pt had knee surgery in May.

## 2023-10-03 NOTE — ED PROVIDER NOTES
[facility] EMERGENCY DEP  EMERGENCY DEPARTMENT ENCOUNTER      Pt Name: Dino Bundy  MRN: [id number]  [address] [facility] [place] 1975  Date of evaluation: 10/3/2023  Provider: Saundra Conrad PA-C    CHIEF COMPLAINT       Chief Complaint   Patient presents with    Knee Pain         HISTORY OF PRESENT ILLNESS    HPI  Patient is a 41 y.o. male who presents today with complaints of L knee pain and swelling for the last 2 days. Reports twisting injury earlier in the week with progressively worsening pain. He is taking Percocet with minimal improvement of symptoms. He did undergo surgery of his left knee back in May 2023 by Dr. Francheska Graham. Nursing Notes were reviewed. REVIEW OF SYSTEMS       Review of Systems   Constitutional:  Negative for fever. HENT:  Negative for congestion. Eyes:  Negative for discharge. Respiratory:  Negative for shortness of breath. Cardiovascular:  Negative for chest pain. Gastrointestinal:  Negative for abdominal pain. Genitourinary:  Negative for dysuria. Musculoskeletal:         Left knee pain, swelling   Skin:  Negative for wound. Neurological:  Negative for seizures. Psychiatric/Behavioral:  Negative for suicidal ideas. Except as noted above the remainder of the review of systems was reviewed and negative.        PAST MEDICAL HISTORY     Past Medical History:   Diagnosis Date    Hypertension     Pulmonary embolism ([address])     Rotator cuff injury     Valvular heart disease     trace mitral valve regurg 8/19         SURGICAL HISTORY       Past Surgical History:   Procedure Laterality Date    BACK SURGERY  1999    ORTHOPEDIC SURGERY      finger     SHOULDER ARTHROSCOPY           CURRENT MEDICATIONS       Previous Medications    IBUPROFEN (ADVIL;MOTRIN) 600 MG TABLET    ceived the following from [facility]: Outside name: ibuprofen (MOTRIN) 600 mg tablet    MAGNESIUM OXIDE (MAG-OX) 400 MG TABLET    Take by mouth daily    VITAMIN D (CHOLECALCIFEROL) 25 MCG

## 2023-10-04 NOTE — ED NOTES
The patient left the Emergency Department ambulatory with crutches, alert and oriented and in no acute distress. The patient was encouraged to call or return to the ED for worsening issues or problems and was encouraged to schedule a follow up appointment for continuing care. The patient verbalized understanding of discharge instructions and prescriptions, all questions were answered. The patient has no further concerns at this time.          Kavon Das RN  10/03/23 8232

## 2023-10-04 NOTE — DISCHARGE INSTRUCTIONS
Keep the knee immobilizer on and do not put any weight on your left leg until you see orthopedic surgeon. Take medications as needed for pain. Elevate the leg is much as possible, apply ice for 20 minutes every 4 hours and return if any new or concerning symptoms.

## 2023-10-31 ENCOUNTER — APPOINTMENT (OUTPATIENT)
Facility: HOSPITAL | Age: 48
End: 2023-10-31
Payer: COMMERCIAL

## 2023-10-31 ENCOUNTER — HOSPITAL ENCOUNTER (EMERGENCY)
Facility: HOSPITAL | Age: 48
Discharge: HOME | End: 2023-10-31
Attending: EMERGENCY MEDICINE
Payer: COMMERCIAL

## 2023-10-31 VITALS
WEIGHT: 252.65 LBS | DIASTOLIC BLOOD PRESSURE: 95 MMHG | TEMPERATURE: 98.4 F | HEIGHT: 67 IN | HEART RATE: 100 BPM | BODY MASS INDEX: 39.65 KG/M2 | OXYGEN SATURATION: 94 % | RESPIRATION RATE: 18 BRPM | SYSTOLIC BLOOD PRESSURE: 165 MMHG

## 2023-10-31 DIAGNOSIS — I82.452 ACUTE DEEP VEIN THROMBOSIS (DVT) OF LEFT PERONEAL VEIN (HCC): Primary | ICD-10-CM

## 2023-10-31 LAB
ANION GAP SERPL CALC-SCNC: 4 MMOL/L (ref 5–15)
BASOPHILS # BLD: 0.1 K/UL (ref 0–0.1)
BASOPHILS NFR BLD: 1 % (ref 0–1)
BUN SERPL-MCNC: 11 MG/DL (ref 6–20)
BUN/CREAT SERPL: 9 (ref 12–20)
CALCIUM SERPL-MCNC: 9.1 MG/DL (ref 8.5–10.1)
CHLORIDE SERPL-SCNC: 106 MMOL/L (ref 97–108)
CO2 SERPL-SCNC: 31 MMOL/L (ref 21–32)
CREAT SERPL-MCNC: 1.16 MG/DL (ref 0.7–1.3)
DIFFERENTIAL METHOD BLD: ABNORMAL
ECHO BSA: 2.33 M2
EOSINOPHIL # BLD: 0.2 K/UL (ref 0–0.4)
EOSINOPHIL NFR BLD: 3 % (ref 0–7)
ERYTHROCYTE [DISTWIDTH] IN BLOOD BY AUTOMATED COUNT: 12.9 % (ref 11.5–14.5)
GLUCOSE SERPL-MCNC: 115 MG/DL (ref 65–100)
HCT VFR BLD AUTO: 36.4 % (ref 36.6–50.3)
HGB BLD-MCNC: 12.8 G/DL (ref 12.1–17)
IMM GRANULOCYTES # BLD AUTO: 0 K/UL (ref 0–0.04)
IMM GRANULOCYTES NFR BLD AUTO: 0 % (ref 0–0.5)
LYMPHOCYTES # BLD: 1.3 K/UL (ref 0.8–3.5)
LYMPHOCYTES NFR BLD: 20 % (ref 12–49)
MAGNESIUM SERPL-MCNC: 2.2 MG/DL (ref 1.6–2.4)
MCH RBC QN AUTO: 30 PG (ref 26–34)
MCHC RBC AUTO-ENTMCNC: 35.2 G/DL (ref 30–36.5)
MCV RBC AUTO: 85.4 FL (ref 80–99)
MONOCYTES # BLD: 0.6 K/UL (ref 0–1)
MONOCYTES NFR BLD: 10 % (ref 5–13)
NEUTS SEG # BLD: 4.3 K/UL (ref 1.8–8)
NEUTS SEG NFR BLD: 66 % (ref 32–75)
NRBC # BLD: 0 K/UL (ref 0–0.01)
NRBC BLD-RTO: 0 PER 100 WBC
PLATELET # BLD AUTO: 198 K/UL (ref 150–400)
PMV BLD AUTO: 9.5 FL (ref 8.9–12.9)
POTASSIUM SERPL-SCNC: 3.9 MMOL/L (ref 3.5–5.1)
RBC # BLD AUTO: 4.26 M/UL (ref 4.1–5.7)
SODIUM SERPL-SCNC: 141 MMOL/L (ref 136–145)
TROPONIN I SERPL HS-MCNC: 38 NG/L (ref 0–76)
WBC # BLD AUTO: 6.5 K/UL (ref 4.1–11.1)

## 2023-10-31 PROCEDURE — 85025 COMPLETE CBC W/AUTO DIFF WBC: CPT

## 2023-10-31 PROCEDURE — 84484 ASSAY OF TROPONIN QUANT: CPT

## 2023-10-31 PROCEDURE — 99285 EMERGENCY DEPT VISIT HI MDM: CPT

## 2023-10-31 PROCEDURE — 80048 BASIC METABOLIC PNL TOTAL CA: CPT

## 2023-10-31 PROCEDURE — 6360000004 HC RX CONTRAST MEDICATION

## 2023-10-31 PROCEDURE — 93005 ELECTROCARDIOGRAM TRACING: CPT | Performed by: EMERGENCY MEDICINE

## 2023-10-31 PROCEDURE — 6370000000 HC RX 637 (ALT 250 FOR IP): Performed by: EMERGENCY MEDICINE

## 2023-10-31 PROCEDURE — 71046 X-RAY EXAM CHEST 2 VIEWS: CPT

## 2023-10-31 PROCEDURE — 36415 COLL VENOUS BLD VENIPUNCTURE: CPT

## 2023-10-31 PROCEDURE — 93971 EXTREMITY STUDY: CPT

## 2023-10-31 PROCEDURE — 71275 CT ANGIOGRAPHY CHEST: CPT

## 2023-10-31 PROCEDURE — 83735 ASSAY OF MAGNESIUM: CPT

## 2023-10-31 RX ORDER — IOPAMIDOL 755 MG/ML
100 INJECTION, SOLUTION INTRAVASCULAR
Status: COMPLETED | OUTPATIENT
Start: 2023-10-31 | End: 2023-10-31

## 2023-10-31 RX ORDER — ACETAMINOPHEN 500 MG
1000 TABLET ORAL
Status: DISCONTINUED | OUTPATIENT
Start: 2023-10-31 | End: 2023-10-31 | Stop reason: HOSPADM

## 2023-10-31 RX ADMIN — IOPAMIDOL 100 ML: 755 INJECTION, SOLUTION INTRAVENOUS at 10:37

## 2023-10-31 RX ADMIN — APIXABAN 5 MG: 5 TABLET, FILM COATED ORAL at 10:14

## 2023-10-31 ASSESSMENT — PAIN SCALES - GENERAL: PAINLEVEL_OUTOF10: 8

## 2023-10-31 NOTE — ED NOTES
EMERGENCY DEPARTMENT HISTORY AND PHYSICAL EXAM      Date: 10/31/2023  Patient Name: Froylan Abbott    History of Presenting Illness     Chief Complaint   Patient presents with    Leg Pain     Was woken from sleep this morning by new L calf pain, tenderness, and swelling; reports he had L knee surgery about 3 weeks ago; states he takes Eliquis; reports hx PE; denies SOB, does endorse chest tightness that began yesterday         HPI: History From: patient, History limited by: none  Froylan Abbott, 41 y.o. 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 Patient reports that he was recently admitted to [facility] for I&D for septic arthritis to his left knee on 10/07/2023. He received formal washout at that time and it was recommended that patient receive IV ceftriaxone through 11/03 via PICC line that was placed 10/10. He was started on 2.5mg BID Eliquis for 30 days postop for DVT prophylaxis, patient states that he has missed a few doses. He reports that 2 days ago he was having intermittent central chest pain without associated dyspnea, headache, vision change, nausea, vomiting, or abdominal pain. Yesterday he noted that his left knee appeared to be more edematous despite elevation of the extremity as advised with new onset symptoms this morning. Does not endorse chest pain currently. He also endorses smoking 0.5-1 packs/day as well as smoking marijuana daily. Unknown prior malignancy. There are no other complaints, changes, or physical findings at this time. PCP: Christian Dewey MD    No current facility-administered medications on file prior to encounter.      Current Outpatient Medications on File Prior to Encounter   Medication Sig Dispense Refill    naproxen (NAPROSYN) 500 MG tablet Take 1 tablet by mouth 2 times daily as needed for Pain 30 tablet 0    lidocaine 4 % external

## 2023-10-31 NOTE — ED NOTES
Pt discharged by Ravindra Fall MD. Discharge instructions discussed and pt given opportunity to ask questions.  Pt ambulatory out of ED        [place], [place]  76/28/62 8901

## 2023-10-31 NOTE — DISCHARGE INSTRUCTIONS
You were seen in the emergency department due to pain and swelling in your left calf, this was confirmed by ultrasound to be a new deep vein thrombosis or blood clot in your popliteal vein. We have increased your dose of Eliquis from 2.5 mg to 5 mg twice a day as a therapeutic dose. It is extremely important that you take this as prescribed as you now have a blood clot in the setting of your prior recent surgery. Please follow-up with your primary care doctor regarding any further questions and continue receiving your antibiotic therapy in the setting of your previous septic joint.

## 2023-11-01 LAB
EKG ATRIAL RATE: 90 BPM
EKG DIAGNOSIS: NORMAL
EKG P AXIS: 39 DEGREES
EKG P-R INTERVAL: 132 MS
EKG Q-T INTERVAL: 334 MS
EKG QRS DURATION: 80 MS
EKG QTC CALCULATION (BAZETT): 408 MS
EKG R AXIS: 16 DEGREES
EKG T AXIS: 36 DEGREES
EKG VENTRICULAR RATE: 90 BPM

## 2024-01-04 ENCOUNTER — OFFICE VISIT (OUTPATIENT)
Age: 49
End: 2024-01-04
Payer: COMMERCIAL

## 2024-01-04 VITALS
BODY MASS INDEX: 40.81 KG/M2 | HEART RATE: 73 BPM | WEIGHT: 260 LBS | DIASTOLIC BLOOD PRESSURE: 84 MMHG | OXYGEN SATURATION: 100 % | HEIGHT: 67 IN | SYSTOLIC BLOOD PRESSURE: 136 MMHG | RESPIRATION RATE: 20 BRPM | TEMPERATURE: 97 F

## 2024-01-04 DIAGNOSIS — R73.02 IMPAIRED GLUCOSE TOLERANCE (ORAL): ICD-10-CM

## 2024-01-04 DIAGNOSIS — I10 ESSENTIAL (PRIMARY) HYPERTENSION: ICD-10-CM

## 2024-01-04 DIAGNOSIS — Z12.5 PROSTATE CANCER SCREENING: ICD-10-CM

## 2024-01-04 DIAGNOSIS — E55.9 VITAMIN D DEFICIENCY, UNSPECIFIED: ICD-10-CM

## 2024-01-04 DIAGNOSIS — Z00.00 ENCOUNTER FOR ANNUAL PHYSICAL EXAM: Primary | ICD-10-CM

## 2024-01-04 DIAGNOSIS — E78.00 PURE HYPERCHOLESTEROLEMIA, UNSPECIFIED: ICD-10-CM

## 2024-01-04 DIAGNOSIS — Z13.29 SCREENING FOR THYROID DISORDER: ICD-10-CM

## 2024-01-04 DIAGNOSIS — M21.611 BUNION OF RIGHT FOOT: ICD-10-CM

## 2024-01-04 DIAGNOSIS — R35.0 FREQUENCY OF URINATION: ICD-10-CM

## 2024-01-04 PROBLEM — M94.262 CHONDROMALACIA OF LEFT KNEE: Status: ACTIVE | Noted: 2023-04-14

## 2024-01-04 PROBLEM — E66.9 OBESITY (BMI 35.0-39.9 WITHOUT COMORBIDITY): Status: ACTIVE | Noted: 2019-04-04

## 2024-01-04 PROBLEM — M23.42 LOOSE BODY IN KNEE, LEFT: Status: ACTIVE | Noted: 2023-04-14

## 2024-01-04 PROBLEM — M00.9 INFECTION OF LEFT KNEE (HCC): Status: ACTIVE | Noted: 2023-10-06

## 2024-01-04 PROCEDURE — 3075F SYST BP GE 130 - 139MM HG: CPT | Performed by: INTERNAL MEDICINE

## 2024-01-04 PROCEDURE — 3079F DIAST BP 80-89 MM HG: CPT | Performed by: INTERNAL MEDICINE

## 2024-01-04 PROCEDURE — 99396 PREV VISIT EST AGE 40-64: CPT | Performed by: INTERNAL MEDICINE

## 2024-01-04 SDOH — ECONOMIC STABILITY: FOOD INSECURITY: WITHIN THE PAST 12 MONTHS, YOU WORRIED THAT YOUR FOOD WOULD RUN OUT BEFORE YOU GOT MONEY TO BUY MORE.: NEVER TRUE

## 2024-01-04 SDOH — ECONOMIC STABILITY: HOUSING INSECURITY
IN THE LAST 12 MONTHS, WAS THERE A TIME WHEN YOU DID NOT HAVE A STEADY PLACE TO SLEEP OR SLEPT IN A SHELTER (INCLUDING NOW)?: NO

## 2024-01-04 SDOH — ECONOMIC STABILITY: FOOD INSECURITY: WITHIN THE PAST 12 MONTHS, THE FOOD YOU BOUGHT JUST DIDN'T LAST AND YOU DIDN'T HAVE MONEY TO GET MORE.: NEVER TRUE

## 2024-01-04 SDOH — ECONOMIC STABILITY: INCOME INSECURITY: HOW HARD IS IT FOR YOU TO PAY FOR THE VERY BASICS LIKE FOOD, HOUSING, MEDICAL CARE, AND HEATING?: NOT HARD AT ALL

## 2024-01-04 ASSESSMENT — PATIENT HEALTH QUESTIONNAIRE - PHQ9
SUM OF ALL RESPONSES TO PHQ QUESTIONS 1-9: 0
2. FEELING DOWN, DEPRESSED OR HOPELESS: 0
SUM OF ALL RESPONSES TO PHQ9 QUESTIONS 1 & 2: 0
SUM OF ALL RESPONSES TO PHQ QUESTIONS 1-9: 0
1. LITTLE INTEREST OR PLEASURE IN DOING THINGS: 0

## 2024-01-04 NOTE — PROGRESS NOTES
Chief Complaint   Patient presents with    Follow-up     1. Have you been to the ER, urgent care clinic since your last visit?  Hospitalized since your last visit?No    2. Have you seen or consulted any other health care providers outside of the [facility] since your last visit?  Include any pap smears or colon screening. No

## 2024-01-04 NOTE — PROGRESS NOTES
Chief Complaint   Patient presents with    Follow-up     HPI:  Luis Pineda is a 48 y.o. AA male with h/o hypertension, PE, valvular heart disease presents for follow-up  Blood pressure reading is at goal. He has concern for foot pain more so on the right.  Patient is due for annual physical exam.    Review of Systems  As per hpi    Past Medical History:   Diagnosis Date    Hypertension     Pulmonary embolism (H[facility])     Rotator cuff injury     Valvular heart disease     trace mitral valve regurg 8/19     Past Surgical History:   Procedure Laterality Date    BACK SURGERY  1999    ORTHOPEDIC SURGERY      finger     SHOULDER ARTHROSCOPY       Social History     Socioeconomic History    Marital status: Single     Spouse name: None    Number of children: None    Years of education: None    Highest education level: None   Tobacco Use    Smoking status: Every Day     Current packs/day: 0.50     Types: Cigarettes    Smokeless tobacco: Never   Substance and Sexual Activity    Alcohol use: Yes    Drug use: Yes     Types: Marijuana (Weed)     Social Determinants of Health     Financial Resource Strain: Low Risk  (1/4/2024)    Overall Financial Resource Strain (CARDIA)     Difficulty of Paying Living Expenses: Not hard at all   Food Insecurity: No Food Insecurity (1/4/2024)    Hunger Vital Sign     Worried About Running Out of Food in the Last Year: Never true     Ran Out of Food in the Last Year: Never true   Transportation Needs: Unknown (1/4/2024)    PRAPARE - Transportation     Lack of Transportation (Non-Medical): No   Housing Stability: Unknown (1/4/2024)    Housing Stability Vital Sign     Unstable Housing in the Last Year: No     Family History   Problem Relation Age of Onset    Diabetes Paternal Grandmother     Hypertension Paternal Uncle     Hypertension Father     Asthma Mother     Hypertension Mother      Current Outpatient Medications   Medication Sig Dispense Refill    vitamin D (CHOLECALCIFEROL) 25 MCG

## 2024-01-12 DIAGNOSIS — E55.9 VITAMIN D DEFICIENCY, UNSPECIFIED: ICD-10-CM

## 2024-01-12 DIAGNOSIS — I10 ESSENTIAL (PRIMARY) HYPERTENSION: ICD-10-CM

## 2024-01-12 DIAGNOSIS — E78.00 PURE HYPERCHOLESTEROLEMIA, UNSPECIFIED: ICD-10-CM

## 2024-01-12 DIAGNOSIS — Z12.5 PROSTATE CANCER SCREENING: ICD-10-CM

## 2024-01-12 DIAGNOSIS — Z00.00 ENCOUNTER FOR ANNUAL PHYSICAL EXAM: ICD-10-CM

## 2024-01-12 DIAGNOSIS — R73.02 IMPAIRED GLUCOSE TOLERANCE (ORAL): ICD-10-CM

## 2024-01-12 DIAGNOSIS — R35.0 FREQUENCY OF URINATION: ICD-10-CM

## 2024-01-12 DIAGNOSIS — Z13.29 SCREENING FOR THYROID DISORDER: ICD-10-CM

## 2024-01-12 LAB
25(OH)D3 SERPL-MCNC: 13 NG/ML (ref 30–100)
ALBUMIN SERPL-MCNC: 4 G/DL (ref 3.5–5)
ALBUMIN/GLOB SERPL: 1.3 (ref 1.1–2.2)
ALP SERPL-CCNC: 48 U/L (ref 45–117)
ALT SERPL-CCNC: 19 U/L (ref 12–78)
ANION GAP SERPL CALC-SCNC: 3 MMOL/L (ref 5–15)
AST SERPL-CCNC: 8 U/L (ref 15–37)
BILIRUB SERPL-MCNC: 0.6 MG/DL (ref 0.2–1)
BUN SERPL-MCNC: 11 MG/DL (ref 6–20)
BUN/CREAT SERPL: 9 (ref 12–20)
CALCIUM SERPL-MCNC: 8.6 MG/DL (ref 8.5–10.1)
CHLORIDE SERPL-SCNC: 110 MMOL/L (ref 97–108)
CHOLEST SERPL-MCNC: 216 MG/DL
CO2 SERPL-SCNC: 30 MMOL/L (ref 21–32)
CREAT SERPL-MCNC: 1.23 MG/DL (ref 0.7–1.3)
EST. AVERAGE GLUCOSE BLD GHB EST-MCNC: 97 MG/DL
GLOBULIN SER CALC-MCNC: 3.2 G/DL (ref 2–4)
GLUCOSE SERPL-MCNC: 94 MG/DL (ref 65–100)
HBA1C MFR BLD: 5 % (ref 4–5.6)
HDLC SERPL-MCNC: 67 MG/DL
HDLC SERPL: 3.2 (ref 0–5)
LDLC SERPL CALC-MCNC: 127 MG/DL (ref 0–100)
POTASSIUM SERPL-SCNC: 4.8 MMOL/L (ref 3.5–5.1)
PROT SERPL-MCNC: 7.2 G/DL (ref 6.4–8.2)
SODIUM SERPL-SCNC: 143 MMOL/L (ref 136–145)
TRIGL SERPL-MCNC: 110 MG/DL
TSH SERPL DL<=0.05 MIU/L-ACNC: 0.55 UIU/ML (ref 0.36–3.74)
VLDLC SERPL CALC-MCNC: 22 MG/DL

## 2024-01-13 LAB
BASOPHILS # BLD: 0 K/UL (ref 0–0.1)
BASOPHILS NFR BLD: 1 % (ref 0–1)
DIFFERENTIAL METHOD BLD: NORMAL
EOSINOPHIL # BLD: 0.2 K/UL (ref 0–0.4)
EOSINOPHIL NFR BLD: 5 % (ref 0–7)
ERYTHROCYTE [DISTWIDTH] IN BLOOD BY AUTOMATED COUNT: 13.3 % (ref 11.5–14.5)
HCT VFR BLD AUTO: 43.9 % (ref 36.6–50.3)
HGB BLD-MCNC: 15.1 G/DL (ref 12.1–17)
IMM GRANULOCYTES # BLD AUTO: 0 K/UL (ref 0–0.04)
IMM GRANULOCYTES NFR BLD AUTO: 0 % (ref 0–0.5)
LYMPHOCYTES # BLD: 1.8 K/UL (ref 0.8–3.5)
LYMPHOCYTES NFR BLD: 40 % (ref 12–49)
MCH RBC QN AUTO: 29.6 PG (ref 26–34)
MCHC RBC AUTO-ENTMCNC: 34.4 G/DL (ref 30–36.5)
MCV RBC AUTO: 86.1 FL (ref 80–99)
MONOCYTES # BLD: 0.5 K/UL (ref 0–1)
MONOCYTES NFR BLD: 12 % (ref 5–13)
NEUTS SEG # BLD: 1.9 K/UL (ref 1.8–8)
NEUTS SEG NFR BLD: 42 % (ref 32–75)
NRBC # BLD: 0 K/UL (ref 0–0.01)
NRBC BLD-RTO: 0 PER 100 WBC
PLATELET # BLD AUTO: 164 K/UL (ref 150–400)
PMV BLD AUTO: 10.2 FL (ref 8.9–12.9)
PSA SERPL-MCNC: 0.3 NG/ML (ref 0.01–4)
RBC # BLD AUTO: 5.1 M/UL (ref 4.1–5.7)
WBC # BLD AUTO: 4.4 K/UL (ref 4.1–11.1)

## 2024-01-29 NOTE — DISCHARGE SUMMARY
Physician Discharge Summary           Pt Name  Karin Lemus   Admit date:  8/8/2019   Discharge date and time:   08/10/19 11:33 AM    Room Number  2245/01    Medical Record Number  [id number] @ [facility]   Age  41 y.o. Date of Birth 1975   PCP Ai Flanagan MD     Admission Diagnoses:                        Acute pulmonary embolism ([address]   Present on Admission:   Acute pulmonary embolism ([address]   HTN (hypertension)   Obesity, morbid ([address]   Tobacco abuse disorder       Allergies   Allergen Reactions    Morphine Nausea and Vomiting        Excerpt from HPI :   \"This is a 45-year-old male with past medical history of hypertension, obesity is coming to the hospital with chief complaints of chest pain and also shortness of breath since the last 1 week.  Patient reports he was in his usual state of health until about a week ago when he started having sharp chest pain with progressive shortness of breath with sickle activity.  Does not report any cough or phlegm.  Denies fever or chills.  He reports using testosterone supplement about 6 months ago. Betty Jenkins not report any long-distance [place] [facility] [place]. Betty Jenkins not report any family history of blood clots.  Does not report any similar episodes in the past.  Denies abdominal pain, nausea or vomiting. On arrival to the hospital his vital signs were within normal limits except for mild tachycardia.  On lab work he was noted to have a white count of 11.2.  Creatinine was 1.39 on lab work. [facility] had a CTA of the chest which showed evidence of bilateral large pulmonary embolism with bilateral patchy atelectasis or airspace disease.  He was started on heparin drip. \"        Hospital Course: This pt was admitted with  Acute pulmonary embolism ([address] on 8/8/2019.       Present on Admission:   Acute pulmonary embolism ([address]   HTN (hypertension)   Obesity, morbid ([address]   Tobacco abuse disorder    He remained What Type Of Note Output Would You Prefer (Optional)?: Bullet Format How Severe Is Your Skin Lesion?: mild on Heparin drip with improvement in his respiratory issues. Troponin, EKG, Echo didn't reveal any sign of PE. He is now being switched to Eliquis loading dose and will be discharged to home with new Rx. I have discussed with CM and at this time I met with the pt second time and explained to him that it is his responsibility to contact the financial assistance program and continue eliquis for about six months unless otherwise instructed. Treatment team Treatment Team: Attending Provider:  Sneha Babb MD; Consulting Provider: Mile Ambrose MD; Consulting Provider: Deysi Brand MD; Primary Nurse: Angie Pardo, [facility]; Utilization Review: Roger Crawford, [facility]; Care Manager: Clover Villalba   Consultations  IP CONSULT TO ONCOLOGY   Procedures/Surgeries  * No surgery found *     Condition at the time of discharge  Improved and stable       Query  None noted today        Disposition Home with family, no needs   Diet Cardiac Diet   Care Plan discussed with Patient/Family and Nurse   Follow up Follow-up Information     Follow up With Specialties Details Why Contact Info    Christine Carey MD Internal Medicine Schedule an appointment as soon as possible for a visit in 2 weeks  [address]  [phone]             Other Pertinent data:   TODAY'S CLINICAL FINDINGS:     Visit Vitals  /62 (BP 1 Location: Left arm, BP Patient Position: At rest)   Pulse 64   Temp 98.2 °F (36.8 °C)   Resp 18   Ht 5' 7\" (1.702 m)   Wt 121.8 kg (268 lb 8.3 oz)   SpO2 100%   BMI 42.06 kg/m²     Wt Readings from Last 10 Encounters:   08/10/19 121.8 kg (268 lb 8.3 oz)   07/31/19 123 kg (271 lb 2.7 oz)   05/24/19 128.4 kg (283 lb)   04/04/19 130.2 kg (287 lb)   01/05/19 129.2 kg (284 lb 13.4 oz)   02/18/16 126.7 kg (279 lb 6.4 oz)   07/08/15 125.6 kg (276 lb 14.4 oz)   09/16/11 116.6 kg (257 lb)   02/01/11 116.1 kg (256 lb)   09/30/10 113.9 kg (251 lb 3.2 oz)      Exam:   Pt Has Your Skin Lesion Been Treated?: not been treated is alert and oriented ; in no apparent distress          Current Discharge Medication List      START taking these medications    Details   apixaban (ELIQUIS) 5 mg (74 tabs) starter pack Take 10 mg (two 5 mg tablets) by mouth twice a day for 7 days   Followed by 5 mg (one 5 mg tablet) by mouth twice a day  Qty: 1 Dose Pack, Refills: 0         CONTINUE these medications which have NOT CHANGED    Details   docosahexanoic acid/epa (FISH OIL PO) Take 1 Tab by mouth daily. cholecalciferol, VITAMIN D3, (VITAMIN D3) 5,000 unit tab tablet Take 1 Tab by mouth daily. Qty: 90 Tab, Refills: 1    Associated Diagnoses: Vitamin D deficiency         STOP taking these medications       multivitamin (ONE A DAY) tablet Comments:   Reason for Stopping:         COLLAGEN Comments:   Reason for Stopping:                  Significant Diagnostic Studies:   Recent Labs     08/09/19  0000 08/08/19  1429   WBC 13.9* 11.2*   HGB 12.4 13.1   HCT 35.3* 38.1    225     Recent Labs     08/09/19  0409 08/08/19  1429    138   K 4.2 4.4   * 104   CO2 24 30   * 97   BUN 19 14   CREA 1.27 1.39*   CA 9.2 9.1   ALB  --  3.4*   TBILI  --  0.6   SGOT  --  27   ALT  --  55     Lab Results   Component Value Date/Time    TSH 0.494 04/04/2019 11:11 AM          Other medical conditions are listed in the active hospital problem list section; these and other pertinent data were taken into consideration when the treatment plan was developed and customized to this patient's unique overall circumstances and needs. High complexity decision making was performed for this patient who is at high risk for decompensation with multiple organ involvement. Today total floor/unit time was 45 minutes while caring for this patient and greater than 50% of that time was spent with patient (and/or family) coordinating patients clinical issues.      Katherin Gonzáles MD MPH  FACP                               8/10/2019 Is This A New Presentation, Or A Follow-Up?: Skin Lesion

## 2024-01-30 ENCOUNTER — OFFICE VISIT (OUTPATIENT)
Age: 49
End: 2024-01-30
Payer: COMMERCIAL

## 2024-01-30 VITALS
SYSTOLIC BLOOD PRESSURE: 125 MMHG | BODY MASS INDEX: 40.49 KG/M2 | RESPIRATION RATE: 20 BRPM | TEMPERATURE: 97.3 F | HEART RATE: 69 BPM | WEIGHT: 258 LBS | DIASTOLIC BLOOD PRESSURE: 79 MMHG | HEIGHT: 67 IN | OXYGEN SATURATION: 100 %

## 2024-01-30 DIAGNOSIS — M25.511 CHRONIC RIGHT SHOULDER PAIN: ICD-10-CM

## 2024-01-30 DIAGNOSIS — E78.00 PURE HYPERCHOLESTEROLEMIA, UNSPECIFIED: ICD-10-CM

## 2024-01-30 DIAGNOSIS — G89.29 CHRONIC RIGHT SHOULDER PAIN: ICD-10-CM

## 2024-01-30 DIAGNOSIS — E55.9 VITAMIN D DEFICIENCY, UNSPECIFIED: Primary | ICD-10-CM

## 2024-01-30 PROCEDURE — 99214 OFFICE O/P EST MOD 30 MIN: CPT | Performed by: INTERNAL MEDICINE

## 2024-01-30 PROCEDURE — 3074F SYST BP LT 130 MM HG: CPT | Performed by: INTERNAL MEDICINE

## 2024-01-30 PROCEDURE — 3078F DIAST BP <80 MM HG: CPT | Performed by: INTERNAL MEDICINE

## 2024-01-30 RX ORDER — ACETAMINOPHEN 500 MG
500 TABLET ORAL ONCE
Status: DISCONTINUED | OUTPATIENT
Start: 2024-01-30 | End: 2024-02-01

## 2024-01-30 RX ORDER — PREDNISONE 20 MG/1
20 TABLET ORAL DAILY
Qty: 7 TABLET | Refills: 0 | Status: SHIPPED | OUTPATIENT
Start: 2024-01-30 | End: 2024-02-06

## 2024-01-30 NOTE — PROGRESS NOTES
Chief Complaint   Patient presents with    Results    Shoulder Pain     Talk to Nerve Doctor in 2023 (no treatment)     HPI:  Luis Pineda is a 48 y.o. AA male presents  to review results.  Serum Vit D level is very low. Supplement is called to pharmacy  Cholesterol compared to last is elevated. Low fat die and exercise are advised.  Also, he has additional complaints of right shoulder pain.  Reports he was diagnosed of pinched nerve in 2023 but was never treated    Review of Systems  As per hpi    Past Medical History:   Diagnosis Date    Hypertension     Pulmonary embolism (HCC)     Rotator cuff injury     Valvular heart disease     trace mitral valve regurg 8/19     Past Surgical History:   Procedure Laterality Date    BACK SURGERY  1999    ORTHOPEDIC SURGERY      finger     SHOULDER ARTHROSCOPY       Social History     Socioeconomic History    Marital status: Single     Spouse name: None    Number of children: None    Years of education: None    Highest education level: None   Tobacco Use    Smoking status: Every Day     Current packs/day: 0.50     Types: Cigarettes    Smokeless tobacco: Never   Substance and Sexual Activity    Alcohol use: Yes    Drug use: Yes     Types: Marijuana (Weed)     Social Determinants of Health     Financial Resource Strain: Low Risk  (1/4/2024)    Overall Financial Resource Strain (CARDIA)     Difficulty of Paying Living Expenses: Not hard at all   Food Insecurity: No Food Insecurity (1/4/2024)    Hunger Vital Sign     Worried About Running Out of Food in the Last Year: Never true     Ran Out of Food in the Last Year: Never true   Transportation Needs: Unknown (1/4/2024)    PRAPARE - Transportation     Lack of Transportation (Non-Medical): No   Housing Stability: Unknown (1/4/2024)    Housing Stability Vital Sign     Unstable Housing in the Last Year: No     Family History   Problem Relation Age of Onset    Diabetes Paternal Grandmother     Hypertension Paternal Uncle

## 2024-05-30 ENCOUNTER — OFFICE VISIT (OUTPATIENT)
Age: 49
End: 2024-05-30
Payer: COMMERCIAL

## 2024-05-30 VITALS
TEMPERATURE: 97.5 F | WEIGHT: 258 LBS | OXYGEN SATURATION: 100 % | BODY MASS INDEX: 40.49 KG/M2 | RESPIRATION RATE: 20 BRPM | HEART RATE: 60 BPM | HEIGHT: 67 IN | DIASTOLIC BLOOD PRESSURE: 76 MMHG | SYSTOLIC BLOOD PRESSURE: 126 MMHG

## 2024-05-30 DIAGNOSIS — R53.82 CHRONIC FATIGUE: Primary | ICD-10-CM

## 2024-05-30 DIAGNOSIS — I10 ESSENTIAL (PRIMARY) HYPERTENSION: ICD-10-CM

## 2024-05-30 DIAGNOSIS — I26.99 OTHER ACUTE PULMONARY EMBOLISM, UNSPECIFIED WHETHER ACUTE COR PULMONALE PRESENT (HCC): ICD-10-CM

## 2024-05-30 DIAGNOSIS — E53.8 B12 DEFICIENCY: ICD-10-CM

## 2024-05-30 DIAGNOSIS — Z72.0 TOBACCO ABUSE DISORDER: ICD-10-CM

## 2024-05-30 DIAGNOSIS — E66.01 OBESITY, CLASS III, BMI 40-49.9 (MORBID OBESITY) (HCC): ICD-10-CM

## 2024-05-30 DIAGNOSIS — E78.5 DYSLIPIDEMIA (HIGH LDL; LOW HDL): ICD-10-CM

## 2024-05-30 DIAGNOSIS — E55.9 VITAMIN D DEFICIENCY: ICD-10-CM

## 2024-05-30 PROCEDURE — 99214 OFFICE O/P EST MOD 30 MIN: CPT | Performed by: INTERNAL MEDICINE

## 2024-05-30 PROCEDURE — 3074F SYST BP LT 130 MM HG: CPT | Performed by: INTERNAL MEDICINE

## 2024-05-30 PROCEDURE — 3078F DIAST BP <80 MM HG: CPT | Performed by: INTERNAL MEDICINE

## 2024-05-30 RX ORDER — LANOLIN ALCOHOL/MO/W.PET/CERES
1000 CREAM (GRAM) TOPICAL DAILY
Qty: 30 TABLET | Refills: 3 | Status: SHIPPED | OUTPATIENT
Start: 2024-05-30

## 2024-05-30 ASSESSMENT — PATIENT HEALTH QUESTIONNAIRE - PHQ9
SUM OF ALL RESPONSES TO PHQ QUESTIONS 1-9: 0
SUM OF ALL RESPONSES TO PHQ QUESTIONS 1-9: 0
1. LITTLE INTEREST OR PLEASURE IN DOING THINGS: NOT AT ALL
SUM OF ALL RESPONSES TO PHQ QUESTIONS 1-9: 0
2. FEELING DOWN, DEPRESSED OR HOPELESS: NOT AT ALL
SUM OF ALL RESPONSES TO PHQ QUESTIONS 1-9: 0
SUM OF ALL RESPONSES TO PHQ9 QUESTIONS 1 & 2: 0

## 2024-05-30 NOTE — PROGRESS NOTES
CAPS Take 1 capsule by mouth daily 90 capsule 1    apixaban (ELIQUIS) 5 MG TABS tablet Take 1 tablet by mouth 2 times daily 60 tablet 0     No current facility-administered medications for this visit.     Allergies   Allergen Reactions    Morphine Nausea And Vomiting       Objective:  /76   Pulse 60   Temp 97.5 °F (36.4 °C) (Temporal)   Resp 20   Ht 1.702 m (5' 7\")   Wt 117 kg (258 lb)   SpO2 100%   BMI 40.41 kg/m²   Physical Exam:   General appearance - alert, well appearing in no distress  Mental status - alert, oriented to person, place, and time  EYE-PERRL, EOMI  ENT-ENT exam normal, no neck nodes or sinus tenderness  Neck - supple, no significant adenopathy   Chest - no wheezes, rales or rhonchi, symmetric air entry   Heart -normal S1, S2, no murmurs, rubs  Abdomen - soft, nontender, nondistended, no organomegaly  Ext-peripheral pulses normal, no pedal edema  Neuro -no focal findings       Assessment/Plan:  Luis was seen today for hypertension.    Diagnoses and all orders for this visit:    Chronic fatigue  -     CBC with Auto Differential; Future  -     Testosterone, free, total; Future  -     Comprehensive Metabolic Panel; Future    Tobacco abuse disorder    Essential (primary) hypertension  -     CBC with Auto Differential; Future  -     Comprehensive Metabolic Panel; Future    Vitamin D deficiency  -     Vitamin D 25 Hydroxy; Future    Dyslipidemia (high LDL; low HDL)  -     Lipid Panel; Future    Obesity, Class III, BMI 40-49.9 (morbid obesity) (HCC)  -     Lipid Panel; Future    Other acute pulmonary embolism, unspecified whether acute cor pulmonale present ([facility])    B12 deficiency  -     vitamin B-12 (CYANOCOBALAMIN) 1000 MCG tablet; Take 1 tablet by mouth daily        Return 2-3 weeks, for results review.